# Patient Record
Sex: MALE | Race: WHITE
[De-identification: names, ages, dates, MRNs, and addresses within clinical notes are randomized per-mention and may not be internally consistent; named-entity substitution may affect disease eponyms.]

---

## 2018-10-29 ENCOUNTER — HOSPITAL ENCOUNTER (INPATIENT)
Dept: HOSPITAL 25 - ED | Age: 68
LOS: 4 days | Discharge: HOME HEALTH | DRG: 309 | End: 2018-11-02
Attending: INTERNAL MEDICINE | Admitting: HOSPITALIST
Payer: COMMERCIAL

## 2018-10-29 DIAGNOSIS — R00.1: ICD-10-CM
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
## 2018-10-29 DIAGNOSIS — Z86.73: ICD-10-CM

## 2018-10-29 DIAGNOSIS — J44.9: ICD-10-CM

## 2018-10-29 DIAGNOSIS — S82.452A: ICD-10-CM

## 2018-10-29 DIAGNOSIS — I45.89: ICD-10-CM

## 2018-10-29 DIAGNOSIS — F17.210: ICD-10-CM

## 2018-10-29 DIAGNOSIS — S82.142A: Primary | ICD-10-CM

## 2018-10-29 DIAGNOSIS — I44.7: ICD-10-CM

## 2018-10-29 DIAGNOSIS — Z23: ICD-10-CM
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
## 2018-10-29 DIAGNOSIS — Z72.89: ICD-10-CM

## 2018-10-29 DIAGNOSIS — Z96.651: ICD-10-CM

## 2018-10-29 LAB
BASOPHILS # BLD AUTO: 0 10^3/UL (ref 0–0.2)
EOSINOPHIL # BLD AUTO: 0.1 10^3/UL (ref 0–0.6)
HCT VFR BLD AUTO: 32 % (ref 42–52)
HGB BLD-MCNC: 11.6 G/DL (ref 14–18)
INR PPP/BLD: 1.08 (ref 0.77–1.02)
LYMPHOCYTES # BLD AUTO: 0.6 10^3/UL (ref 1–4.8)
MCH RBC QN AUTO: 36 PG (ref 27–31)
MCHC RBC AUTO-ENTMCNC: 36 G/DL (ref 31–36)
MCV RBC AUTO: 100 FL (ref 80–94)
MONOCYTES # BLD AUTO: 0.7 10^3/UL (ref 0–0.8)
NEUTROPHILS # BLD AUTO: 11.3 10^3/UL (ref 1.5–7.7)
NRBC # BLD AUTO: 0 10^3/UL
NRBC BLD QL AUTO: 0.1
PLATELET # BLD AUTO: 213 10^3/UL (ref 150–450)
RBC # BLD AUTO: 3.18 10^6/UL (ref 4–5.4)
WBC # BLD AUTO: 12.7 10^3/UL (ref 3.5–10.8)

## 2018-10-29 PROCEDURE — 85610 PROTHROMBIN TIME: CPT

## 2018-10-29 PROCEDURE — 80048 BASIC METABOLIC PNL TOTAL CA: CPT

## 2018-10-29 PROCEDURE — 36415 COLL VENOUS BLD VENIPUNCTURE: CPT

## 2018-10-29 PROCEDURE — 90686 IIV4 VACC NO PRSV 0.5 ML IM: CPT

## 2018-10-29 PROCEDURE — 72190 X-RAY EXAM OF PELVIS: CPT

## 2018-10-29 PROCEDURE — 86901 BLOOD TYPING SEROLOGIC RH(D): CPT

## 2018-10-29 PROCEDURE — 86850 RBC ANTIBODY SCREEN: CPT

## 2018-10-29 PROCEDURE — 85025 COMPLETE CBC W/AUTO DIFF WBC: CPT

## 2018-10-29 PROCEDURE — 76001: CPT

## 2018-10-29 PROCEDURE — C1776 JOINT DEVICE (IMPLANTABLE): HCPCS

## 2018-10-29 PROCEDURE — 81003 URINALYSIS AUTO W/O SCOPE: CPT

## 2018-10-29 PROCEDURE — 86900 BLOOD TYPING SEROLOGIC ABO: CPT

## 2018-10-29 PROCEDURE — 99284 EMERGENCY DEPT VISIT MOD MDM: CPT

## 2018-10-29 PROCEDURE — 93306 TTE W/DOPPLER COMPLETE: CPT

## 2018-10-29 PROCEDURE — 93005 ELECTROCARDIOGRAM TRACING: CPT

## 2018-10-29 PROCEDURE — 90732 PPSV23 VACC 2 YRS+ SUBQ/IM: CPT

## 2018-10-29 PROCEDURE — 71045 X-RAY EXAM CHEST 1 VIEW: CPT

## 2018-10-29 PROCEDURE — C8929 TTE W OR WO FOL WCON,DOPPLER: HCPCS

## 2018-10-29 PROCEDURE — 80053 COMPREHEN METABOLIC PANEL: CPT

## 2018-10-29 PROCEDURE — C1713 ANCHOR/SCREW BN/BN,TIS/BN: HCPCS

## 2018-10-29 PROCEDURE — 85730 THROMBOPLASTIN TIME PARTIAL: CPT

## 2018-10-29 RX ADMIN — MORPHINE SULFATE PRN MG: 4 INJECTION INTRAVENOUS at 15:55

## 2018-10-29 RX ADMIN — HEPARIN SODIUM SCH UNITS: 5000 INJECTION INTRAVENOUS; SUBCUTANEOUS at 18:28

## 2018-10-29 RX ADMIN — MORPHINE SULFATE PRN MG: 4 INJECTION INTRAVENOUS at 21:38

## 2018-10-29 RX ADMIN — DOCUSATE SODIUM SCH MG: 100 CAPSULE, LIQUID FILLED ORAL at 21:38

## 2018-10-29 RX ADMIN — HEPARIN SODIUM SCH: 5000 INJECTION INTRAVENOUS; SUBCUTANEOUS at 21:28

## 2018-10-29 NOTE — RAD
Indication: LEFT tibial plateau fracture. Fall from ladder.



Comparison: Radiographs of the same date.



Technique: Noncontrast CT LEFT knee. Multiplanar reformation.



Report: Large joint effusion with fat fluid level. Small volume of fluid at the

semimembranosus bursa.



Negative for dislocation.



Bone density appears decreased throughout. Comminuted impacted tibial plateau fracture

with dominant oblique axial, coronal, and lateral para midline sagittal fracture planes.

Up to 0.8 cm transverse articular surface discontinuity at the lateral joint compartment

posteriorly. Up to approximate 0.3 cm impaction of the lateral tibial plateau subchondral

bone.



Markedly comminuted fracture of the head and neck of the fibula with segmental impaction.

Extension of the fibula and tibial plateau fractures to the proximal tibiofibular

articulation.



Diffuse soft tissue edema most marked anteriorly.



Osteoarthritis most marked at the medial joint compartment with moderately severe joint

space narrowing, partial flattening of the articular surfaces, and associated subchondral

sclerosis and cystic change.



IMPRESSION: 

#. Comminuted impacted tibial plateau and proximal fibular fractures with associated

lipohemarthrosis as described. 

#. Predisposing decreased bone density. 

#.  Osteoarthritis.

## 2018-10-29 NOTE — RAD
INDICATION:  Left lower leg injury.



TECHNIQUE: 2 views of the left lower leg were obtained.



FINDINGS: There is diffuse soft tissue swelling around the knee extending into the calf.

Again note is made of a comminuted displaced intra-articular fracture of the proximal

tibia and an oblique comminuted slightly displaced fracture of the proximal fibula. No

additional fractures are seen.



IMPRESSION:  COMMINUTED DISPLACED INTRA-ARTICULAR FRACTURE OF THE PROXIMAL TIBIA AND

OBLIQUE COMMINUTED DISPLACED FRACTURE OF THE PROXIMAL FIBULA AS PREVIOUSLY DESCRIBED. NO

ADDITIONAL FRACTURES ARE SEEN.

## 2018-10-29 NOTE — RAD
Indication: Fall off ladder with lower extremity pain and deformity.



4 views of the pelvis including inlet and outlet views demonstrates sacroiliac joint to be

intact. No fracture of the pelvis is noted. Sacral foramina are patent.



IMPRESSION: No fracture of the pelvis is noted.

## 2018-10-29 NOTE — RAD
Indication: Fall, chest pain.



2 views of the chest including dual energy PA view demonstrates no mediastinal shift. Mild

cardiomegaly is noted. Lung fields are clear.



IMPRESSION: Cardiomegaly with no evidence of active cardiopulmonary disease.

## 2018-10-29 NOTE — ED
Progress





- Progress Note


Progress Note: 


posterior fiberglass splint placed on Lt LE - NV intact - pt tolerated well - 

assisted by MEGAN Machado





Re-Evaluation





- Re-Evaluation


  ** First Eval


Re-Evaluation Time: 12:52


Comment: Discussed results of x-rays with patient as well as need for surgery. 

Wife is now present in ED. Wife reports PMHx of COPD in patient. Patient and 

wife are from [place], they are agreeable with patient having surgery at [facility]. 

Wife reports collar bone fracture and skull fracture years ago.





Course/Dx





- Course


Course Of Treatment: Patient is a 69 y/o M w/ c/o left leg pain after falling 

from a six foot ladder. Per EMS, patient was 2 steps up from the ground. 

Patient reports that he was cutting some branches with a saw, tried to take a 

couple of steps up the ladder, leaned back too far and fell. EMS reports patient

's left leg was externally rotated 90 degrees upon arrival. Patient arrived 

with left leg splinted, no open fracture. Leg is noted to be bruised and 

swollen. On physical exam, patient is noted to have swelling at left lower leg, 

bruising to anterior shin, good pulses, good cap refill. inability to lift left 

leg, FROM at left ankle. Multiple x-rays were done, LLE X-RAY, PER RADIOLOGIST, 

SHOWED COMMINUTED DISPLACED INTRA-ARTICULAR FRACTURE OF THE PROXIMAL TIBIA AND.

  OBLIQUE COMMINUTED DISPLACED FRACTURE OF THE PROXIMAL FIBULA AS PREVIOUSLY 

DESCRIBED. NO.  ADDITIONAL FRACTURES ARE SEEN.  1252 - Discussed results of x-

rays with patient as well as need for surgery. Wife is now present in ED. Wife 

reports PMHx of COPD in patient. Patient and wife are from [place], they are 

agreeable with patient having surgery at [facility]. Wife reports collar bone fracture 

and skull fracture years ago.





- Provider Notifications


Time Discussed With Above Provider: 12:55


Instructed by Provider To: Other - 1255 - Dr. Matthew, on call ortho, was 

attempted to be contacted, he is in surgery, will call back.





Discharge





- Sign-Out/Discharge


Documenting (check all that apply): Patient Departure





- Discharge Plan


Condition: Stable


Disposition: ADMITTED TO [facility]


Referrals: 


No Primary Care Phys,NOPCP [Primary Care Provider] - 





- Billing Disposition and Condition


Condition: STABLE


Disposition: Admitted to [facility]

## 2018-10-29 NOTE — CONS
CONSULTATION REPORT:

 

DATE OF CONSULT:  10/29/18

 

ATTENDING ORTHOPEDIC PROVIDER:  Dr. Irving Duran.

 

CHIEF COMPLAINT:  Left leg pain.

 

HISTORY OF PRESENT ILLNESS:  Ramone is a 68-year-old male, who presented to 
[facility] today after sustaining a fall while at work
, off the second rung on the ladder landing on his left lower extremity.  The 
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  He was working at [employer] on this 
occasion.  He states that he was using a [employer], which jerked and he stepped 
back thinking the floor was much closer than it was, then he fell to the 
ground. Immediately, he had pain of the left lower extremity and was unable to 
ambulate.  The patient states that he currently does not have any pain of the 
left lower extremity while he is lying, resting in bed.  He states that he has 
no other injury from this fall. Per ER report there was no open fracture and a 
long leg posterior splint was placed. He has had falls in the past while at 
work under similar circumstance on 2 other occasions falling off a scaffolding, 
resulting in fractures of his left clavicle and skull fracture which required 
placement of a plate.  The patient is unsure if he had a brain bleed or 
traumatic brain injury at this time, but he has no residual effects.  The 
patient denies any feeling of chest pain, shortness of breath, dizziness prior 
to fall.He has no past medical history of stroke, heart attack, blood clot, 
blood transfusion, HIV, or hepatitis.  

 

PAST MEDICAL HISTORY: COPD for which he does not use any medication for 
treatment and is asymptomatic.

 

MEDICATIONS:  Does not take any medication at home.

 

ALLERGIES:  No known drug allergies.

 

FAMILY HISTORY:  Adopted.



Surgical history: right total knee replacement, hernia repair, placement of 
plate s/p skull fracture

 

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  His 
current job is at [employer].  The patient drinks alcohol daily for a total 
of 48 to 72 ounces of beer per day.  He states that he has stopped drinking in 
the past without having any withdrawal symptoms.  He does smoke every day 6 to 
8 cigarillos, though none in the past week.  He does not use any illicit drugs.
  He lives at home with his wife, Jessika, without any assistance at home.  He 
does not use any assistive device to walk.

 

REVIEW OF SYSTEMS:  General:  Negative for fever, chills, recent illness.  HEENT
: No changes in vision, headache, or head trauma on this occasion.  He does 
have a history of head trauma after falling off a scaffolding with no residual 
effects. Cardiac:  No chest pain.  No irregular beats.  No history of heart 
attack. Respiratory:  No shortness of breath.  No cough.  Does have a history 
of COPD. Abdomen:  No abdominal pain.  No vomiting, diarrhea, nausea, or 
constipation.  : No dysuria.  No difficulty with urinary.  Musculoskeletal:  
Positive for left lower extremity pain and known tibial plateau and fibular 
fracture.  Neuro:  Sensation intact throughout all extremities without numbness 
or paraesthesias.  Heme:  No history of blood clot, easy bleeding, or easy 
brushing.  Skin:  No rash or lesions.

 

PHYSICAL EXAM:  Vital Signs:  Temperature 98.3, pulse rate 57, respiratory rate 
16, oxygen saturation 100, blood pressure 102/61.  The patient is well appearing
, in no acute distress.  Alert and oriented to person, place, and time, carries 
on an appropriate conversation.  HEENT:  Head is normocephalic,   he does have 
a surgical scar just anterior to his ear over his right temporal lobe.  
Extraocular movements intact.  Slight yellow discoloration of eyes. Moist 
mucous membranes.  Poor dentition with many absent teeth.  Cardiac:  S1, S2.  
Regular rate and rhythm. Respiratory:  Clear to auscultation bilaterally.  No 
wheezes, rales, or rhonchi. Abdomen:  Bowel sounds normoactive in all 4 
quadrants.  Nontender to palpation.  No guarding.  No rigidity.  Upper 
Extremities:  With skin envelope intact.  No obvious deformity.  Nontender to 
palpation.  Active flexion and extension of the digits, wrists, and elbows 
without any associated pain.  Shoulders with nonpainful active forward flexion 
and abduction.  Left Lower Extremity:  He has a long leg posterior splint in 
place.  There is no obvious rotation about the extremity. His thigh and calf 
remain easily compressible without tenderness to palpation.  His toes are 
exposed and able flex and extend MTPs. DP2+  Capillary refill is less than 2 
seconds distally.  His exposed thigh is soft, nontender.  Passive flexion and 
extension at the hip without any pain.  Logroll of the hip is nonpainful.  
Right lower extremity:  Skin envelope is intact.  No obvious deformity.  
Nontender to palpation.  Active flexion and extension of digits, ankle, knee, 
and hip without any pain.  Negative logroll.  Neuro:  Sensation is intact to 
light touch distally in left lower extremity including plantar,dorsal, medial, 
lateral and 1st webspace of foot.  Sensation intact to light touch throughout 
right lower extremity and bilateral upper extremities.  The patient is alert 
and oriented to conversation.  Skin:  There is no obvious skin breakdown. There 
are no lacerations or lesions noted.

 

DIAGNOSTIC STUDIES/LAB DATA:  CT of the left lower extremity.  Impression per 
Radiology:  Comminuted-impacted tibial plateau and proximal fibular fractures 
with associated lipohemarthrosis present, predisposing decreased bone density, 
osteoarthritis.



LLE xray: IMPRESSION:  COMMINUTED DISPLACED INTRA-ARTICULAR FRACTURE OF THE 
PROXIMAL TIBIA AND

OBLIQUE COMMINUTED DISPLACED FRACTURE OF THE PROXIMAL FIBULA AS PREVIOUSLY 
DESCRIBED. NO

ADDITIONAL FRACTURES.



HIP LEFT 2 VIEWS AND PELVIS: IMPRESSION: No fracture of the left hip or pelvis 
is noted.



ANKLE LEFT 3+VWS IMPRESSION:  NO EVIDENCE FOR FRACTURE.



White blood cell count 12.7, hemoglobin 11.6, hematocrit 32.  INR 1.08.  Sodium 
137, potassium 3.6.

 

ASSESSMENT:  Left tibial plateau and proximal fibula fracture.

 

PLAN/RECOMMENDATIONS:  The patient will be nonweightbearing on his left lower 
extremity.  Keep long leg posterior splint in place.  Ensure edges of splint 
are all well padded to avoid any skin breakdown, padding was reinforced today.  
The patient will be brought to the OR for Ex-Fix vs ORIF with Dr. Duran; we 
anticipate either 10/31/18.  He will be on chemical and mechanical DVT 
prophylaxis until midnight before his surgery.  At midnight preceding  his 
surgery, he will also need to be n.p.o. Elevate and ice the LLE.

 

____________________________________ EMMY CROCKER

 

[id number]/CPS #: [id number]

[facility]ar[facility] HospitalJOSE LUIS

## 2018-10-29 NOTE — RAD
Indication: Left leg pain and hip pain.



2 views of left hip, and 2 views of the left femur are reviewed.



The left hip demonstrates no fracture. No other bone or joint abnormality is identified.

The left femur demonstrates no fracture of the femur. The visualized pelvis is

unremarkable.



IMPRESSION: No fracture of the left hip or pelvis is noted.

## 2018-10-29 NOTE — RAD
INDICATION: Left knee injury.



TECHNIQUE: 2 views of the left knee were obtained.

  

FINDINGS:  There is diffuse soft tissue swelling. There is a moderate joint effusion

present.  There is a transverse comminuted intra-articular fracture of the tibial

metaphysis with extension to the lateral articular surface. The fracture fragments are

impacted and slightly distracted. There is also an oblique slightly displaced comminuted

fracture of the proximal fibular metaphysis.



IMPRESSION:  

1. COMMINUTED DISPLACED INTRA-ARTICULAR FRACTURE OF THE PROXIMAL TIBIA.

2. OBLIQUE COMMINUTED SLIGHTLY DISPLACED FRACTURE OF THE PROXIMAL FIBULA.

## 2018-10-29 NOTE — ED
Lower Extremity





- HPI Summary


HPI Summary: 


Patient is a 69 y/o M w/ c/o left leg pain after falling from a six foot 

ladder. Per EMS, patient was 2 steps up from the ground. Patient reports that 

he was cutting some branches with a saw, tried to take a couple of steps up the 

ladder, leaned back too far and fell. EMS reports patient's left leg was 

externally rotated 90 degrees upon arrival. Patient arrived with left leg 

splinted, no open fracture. Leg is noted to be bruised and swollen. PMHx of 

stroke, MI is denied, patient is adopted. He denies the need for pain 

medication. Patient denies fever, chills, HA, ear pain, sore throat, blurred 

vision, double vision, neck pain, CP, SOB, ABD pain, back pain, dysuria, 

hematuria, blood in the stool, diarrhea, vomiting, anxiety and depression. On 

triage, pain is rated 7/10, nothing is noted to aggravate/alleviate Sx. Home 

medications and allergies are reviewed. 








- History of Current Complaint


Chief Complaint: EDExtremityLower


Stated Complaint: LT LEG INJURY


Hx Obtained From: Patient


Mechanism Of Injury: Fall From Height Of: - two ladder rungs off the ground


Onset of Pain: Prior to Arrival


Onset/Duration: Still Present


Severity Currently: Severe - 7/10


Pain Intensity: 7


Pain Scale Used: 0-10 Numeric - 7/10


Timing: Constant


Location: Is Discrete @ - left leg


Associated Signs And Symptoms: Positive: Swelling, Bruising


Aggravating Factor(s): Nothing


Alleviating Factor(s): Nothing





- Allergies/Home Medications


Allergies/Adverse Reactions: 


 Allergies











Allergy/AdvReac Type Severity Reaction Status Date / Time


 


No Known Allergies Allergy   Verified 10/29/18 11:01











Home Medications: 


 Home Medications





NK [No Home Medications Reported]  10/29/18 [History Confirmed 10/29/18]











PMH/Surg Hx/FS Hx/Imm Hx


Cardiovascular History: 


   Denies: Hx Myocardial Infarction


Neurological History: 


   Denies: Hx CVA


Infectious Disease History: No


Infectious Disease History: 


   Denies: Traveled Outside the [place] in Last 30 Days





- Family History


Known Family History: Positive: Unknown - adopted 





- Social History


Alcohol Use: Daily


Substance Use Type: Reports: None


Smoking Status (MU): Current Every Day Smoker





Review of Systems


Negative: Fever, Chills


Positive: Other - NEGATIVE: double vision.  Negative: Blurred Vision


Negative: Sore Throat, Ear Ache


Negative: Chest Pain


Negative: Cough


Negative: Abdominal Pain, Vomiting, Diarrhea


Positive: other - NEGATIVE: blood in stool .  Negative: dysuria, hematuria


Positive: Edema - left leg , Other - left leg pain 


Positive: Bruising - left leg 


Negative: Headache


Negative: Anxious, Depressed


All Other Systems Reviewed And Are Negative: No





Physical Exam





- Summary


Physical Exam Summary: 


Appearance: Alert, conversive, nontoxic appearing


Skin: Warm, dry, no mottling, no rashes, no contusions


HEENT: EOMI, PERRL, moist mucous membranes


Neck: No masses on the neck, supple


Respiratory: Clear to auscultation, breath sounds present, no rales, no rhonchi

, no wheezes


Cardiovascular: RRR, pulses are symmetrical in both lower and upper extremities


Abdomen: Soft, non-tender


Bowel Sounds: Present


Musculoskeletal: No CVA tenderness, swelling at left lower leg, bruising to 

anterior shin, good pulses, good cap refill. inability to lift left leg, FROM 

at left ankle


Neurological: A&Ox3, CN II-XII Intact, moving all extremities symmetrically


Psychiatric: Normal affect and mood








Triage Information Reviewed: Yes


Vital Signs On Initial Exam: 


 Initial Vitals











Temp Pulse Resp BP Pulse Ox


 


 97.5 F   59   20   144/82   98 


 


 10/29/18 10:56  10/29/18 10:56  10/29/18 10:56  10/29/18 10:56  10/29/18 10:56











Vital Signs Reviewed: Yes





Diagnostics





- Vital Signs


 Vital Signs











  Temp Pulse Resp BP Pulse Ox


 


 10/29/18 10:56  97.5 F  59  20  144/82  98














- Laboratory


Result Diagrams: 


 10/29/18 13:44





 10/29/18 13:44


Lab Statement: Any lab studies that have been ordered have been reviewed, and 

results considered in the medical decision making process.





- Radiology


  ** left ankle x-ray


Radiology Interpretation Completed By: Radiologist


Summary of Radiographic Findings: IMPRESSION:  NO EVIDENCE FOR FRACTURE. THIS 

REPORT WAS REVIEWED BY ED PHYSICIAN.





  ** LEFT KNEE X-RAY


Radiology Interpretation Completed By: Radiologist


Summary of Radiographic Findings: IMPRESSION:  1. COMMINUTED DISPLACED INTRA-

ARTICULAR FRACTURE OF THE PROXIMAL TIBIA.  2. OBLIQUE COMMINUTED SLIGHTLY 

DISPLACED FRACTURE OF THE PROXIMAL FIBULA.  THIS REPORT WAS REVIEWED BY ED 

PHYSICIAN.





  ** LEFT FEMUR X-RAY


Radiology Interpretation Completed By: Radiologist


Summary of Radiographic Findings: IMPRESSION: No fracture of the left hip or 

pelvis is noted. THIS REPORT WAS REVIEWED BY ED PHYSICIAN.





  ** HIP/PELVIS X-RAY


Radiology Interpretation Completed By: Radiologist


Summary of Radiographic Findings: IMPRESSION: No fracture of the left hip or 

pelvis is noted. THIS REPORT WAS REVIEWED BY ED PHYSICIAN.





  ** PELVIS X-RAY


Radiology Interpretation Completed By: Radiologist


Summary of Radiographic Findings: IMPRESSION: No fracture of the pelvis is 

noted. THIS REPORT WAS REVIEWED BY ED PHYSICIAN.





  ** LLE x-ray


Radiology Interpretation Completed By: Radiologist


Summary of Radiographic Findings: IMPRESSION:  COMMINUTED DISPLACED INTRA-

ARTICULAR FRACTURE OF THE PROXIMAL TIBIA AND.  OBLIQUE COMMINUTED DISPLACED 

FRACTURE OF THE PROXIMAL FIBULA AS PREVIOUSLY DESCRIBED. NO.  ADDITIONAL 

FRACTURES ARE SEEN.  THIS REPORT WAS REVIEWED BY ED PHYSICIAN.





  ** CXR


Radiology Interpretation Completed By: Radiologist


Summary of Radiographic Findings: cardiomegaly with no evidence of active 

cardiopulmonary disease, this report was reviewed by ED physician.





- EKG


  ** 1310


Cardiac Rate: Bradycardia - rate of 53 BPM


EKG Rhythm: Sinus Bradycardia


Summary of EKG Findings: prolonged ID, prolonged QRS, normal QTC, nonspecific ST

-T waves changes noted, depression in v5, v6 and lead 1 and avf.





Re-Evaluation





- Re-Evaluation


  ** First Eval


Re-Evaluation Time: 12:52


Comment: Discussed results of x-rays with patient as well as need for surgery. 

Wife is now present in ED. Wife reports PMHx of COPD in patient. Patient and 

wife are from [place], they are agreeable with patient having surgery at [facility]. 

Wife reports collar bone fracture and skull fracture years ago.





Lower Extremity Course/Dx





- Course


Course Of Treatment: Patient is a 69 y/o M w/ c/o left leg pain after falling 

from a six foot ladder. Per EMS, patient was 2 steps up from the ground. 

Patient reports that he was cutting some branches with a saw, tried to take a 

couple of steps up the ladder, leaned back too far and fell. EMS reports patient

's left leg was externally rotated 90 degrees upon arrival. Patient arrived 

with left leg splinted, no open fracture. Leg is noted to be bruised and 

swollen. On physical exam, patient is noted to have swelling at left lower leg, 

bruising to anterior shin, good pulses, good cap refill. inability to lift left 

leg, FROM at left ankle. During ED course, patient received fluids and dilaudid 

1 mg. UA negative. Labs showed glucose 120, calcium 8.5, INR 1.08, WBC 12.7, 

RBC 3.18.  Multiple x-rays were done, LLE X-RAY, PER RADIOLOGIST, SHOWED 

COMMINUTED DISPLACED INTRA-ARTICULAR FRACTURE OF THE PROXIMAL TIBIA AND.  

OBLIQUE COMMINUTED DISPLACED FRACTURE OF THE PROXIMAL FIBULA AS PREVIOUSLY 

DESCRIBED. NO.  ADDITIONAL FRACTURES ARE SEEN.  No ankle, hip, pelvis fracture 

is noted on x-rays.  CXR showed cardiomegaly with no evidence of active 

cardiopulmonary disease.  EKG showed sinus bradycardia with rate of 53 BPM, 

prolonged ID, prolonged QRS, normal QTC, nonspecific ST-T waves changes noted, 

depression in v5, v6 and lead 1 and avf.  1252 - Discussed results of x-rays 

with patient as well as need for surgery. Wife is now present in ED. Wife 

reports PMHx of COPD in patient. Patient and wife are from [place], they are 

agreeable with patient having surgery at [facility]. Wife reports collar bone fracture 

and skull fracture years ago.  1255 - Dr. Matthew, on call ortho, was attempted 

to be contacted, he is in surgery, will call back. 1338 - Discussed patient's 

case with Dr. Drake, Dr. Drake agrees to accept patient for admission. 1401 - 

Dr. Matthew called back. He notes that patient has a complicated fracture, he 

will see if any of his partners will take patient's case. 1407 - Dr. Matthew 

states that Dr. Francois will takes the patient's case and that patient should 

be placed on NPO after midnight. Posterior fiberglass splint placed on Lt LE by 

EMMY Novoa. Dx of closed fracture of proximal end of tibia and fibia.





- Diagnoses


Provider Diagnoses: 


 Closed fracture of proximal end of tibia and fibula








- Physician Notifications


Discussed Care Of Patient With: Ben Matthew


Time Discussed With Above Provider: 12:55


Instructed by Provider To: Other - 1255 - Dr. Matthew, on call ortho, was 

attempted to be contacted, he is in surgery, will call back. 1338 - Discussed 

patient's case with Dr. Drake, Dr. Drake agrees to accept patient for 

admission. 1401 - Dr. Matthew called back. He notes that patient has a 

complicated fracture, he will see if any of his partners will take patient's 

case. 1407 - Dr. Matthew states that Dr. Francois will takes the patient's case 

and that patient should be placed on NPO after midnight.





Discharge





- Sign-Out/Discharge


Documenting (check all that apply): Patient Departure - admit 


All imaging exams completed and their final reports reviewed: Yes





- Discharge Plan


Condition: Stable


Disposition: ADMITTED TO [facility]





- Billing Disposition and Condition


Condition: STABLE


Disposition: Admitted to [facility]





- Attestation Statements


Document Initiated by Scribe: Yes


Documenting Scribe: Elvis Vasquez 


Provider For Whom Patricio is Documenting (Include Credential): Yahaira Cummings MD


Scribe Attestation: 


Elvis GOODEN , scribed for Yahaira Cummings MD on 10/29/18 at 2032. 


Scribe Documentation Reviewed: Yes


Provider Attestation: 


The documentation as recorded by the [email or web address] cortes  accurately reflects 

the service I personally performed and the decisions made by me, Yahaira Cummings MD

## 2018-10-29 NOTE — RAD
INDICATION:  Left ankle injury.



TECHNIQUE: 3 views of the left ankle were obtained.



FINDINGS:  The bones are in normal alignment. No fracture is seen. Joint spaces appear

maintained.



IMPRESSION:  NO EVIDENCE FOR FRACTURE.

## 2018-10-29 NOTE — HP
CC:  Dr. Pb De Santiago, phone # [phone]; Dr. Matthew *

 

HISTORY AND PHYSICAL:

 

DATE OF ADMISSION:  10/29/18

 

TIME OF EVALUATION:  1:45 p.m.

 

PRIMARY CARE PROVIDER:  Dr. Pb De Santiago, [place], [place].  Phone number is 
[phone].

 

CONSULTING ORTHOPEDIST:  Dr. Matthew.

 

CHIEF COMPLAINT:  "I fell."

 

HISTORY OF PRESENT ILLNESS:  Mr. Collier is a 68-year-old male with a past 
medical history of COPD who presented to the emergency room, brought in by EMS 
after sustaining a fall.  The patient is a , states that he 
was on the last 2 rungs from a ladder and he states that he was working with a 
[employer] when he bumped into a stud and it pushed him backwards.  He thought 
that he was at a low enough distance that he could just land on his feet, but 
he states that immediately after landing, he developed severe left lower 
extremity pain.  As per EMS, the patient's left leg was externally rotated at 
90 degrees upon arrival.

 

The patient denies chest pain, palpitations, shortness of breath, or any other 
symptoms preceding his fall.  He states that the only issue was that he lost 
his balance when the [employer] kicked back.

 

The patient was a smoker until last week, but he states that he has quit.  As 
described, he denies any chest pain.  He is physically active doing his work 
with no complaints.

 

PAST MEDICAL HISTORY:  COPD.

 

MEDICATIONS:  None.

 

ALLERGIES:  No known drug allergies.

 

FAMILY HISTORY:  Unknown as the patient is adopted.

 

SOCIAL HISTORY:  He is a .  He used to smoke 10 small cigars 
a day since he was 26.  He states that he drinks two 24-ounce beers every night
, but denies ever having issues with withdrawal on the days that he did not 
drink.  There is no drug use.  Surrogate decision maker is his wife, Jessika Collier, phone number [phone].

 

REVIEW OF SYSTEMS:  A 14-point review of systems was performed and all the 
pertinent negative and positive findings are in the HPI.

 

                               PHYSICAL EXAMINATION

 

GENERAL:  The patient is a pleasant gentleman lying in the ED stretcher, not in 
acute distress.

 

VITAL SIGNS:  Temperature 97.5, heart rate 59, respiratory rate is 16, oxygen 
saturation 99% on room air, blood pressure is 112/65.

 

HEENT:  Pupils are equal.  Moist mucous membranes.

 

CHEST:  Breath sounds bilaterally with no added sounds.

 

CVS:  Normal S1 and S2.  Regular rate and rhythm.

 

ABDOMEN:  Soft.  Bowel sounds are present.

 

EXTREMITIES:  There is deformity and edema to the left lower extremity.  
Sensation is intact.  Good pulses and good capillary refill bilaterally.

 

NEURO:  He is alert and oriented x3.  Able to move all 4 extremities.

 

 LABORATORY AND IMAGING DATA:  The patient's urinalysis was negative.  Other 
laboratory tests are pending at the time of this dictation.

 

An EKG done on 10/29/18 at 1:09 p.m. showed sinus bradycardia at 53 beats per 
minute with a left bundle-branch block.  There is no prior EKG to compare.

 

Pelvis x-ray showed no fracture of the pelvis.  There is no fracture of the 
left hip.  Femur x-ray, no fracture of the left femur.  Knee x-ray showed the 
comminuted displaced intraarticular fracture of the proximal left tibia with an 
oblique comminuted slightly displaced fracture of the proximal fibula.  Ankle x-
ray showed no evidence for fracture.  Chest x-ray showed cardiomegaly with no 
evidence of active cardiopulmonary disease.

 

ASSESSMENT AND PLAN:  Mr. Collier is a 68-year-old male with a past medical 
history of chronic obstructive pulmonary disease, tobacco abuse who presented 
to the emergency room, brought in by EMS after a mechanical fall at work, found 
to have left tib-fib fracture.

 

1.  Left tib-fib fracture.

 

Orthopedics consultation was requested with Dr. Matthew who is in the OR at this 
moment, so we will wait for his recommendations.

 

If the patient does need surgical treatment, we will have to obtain records 
from his primary care provider, especially a prior EKG.  The patient does not 
have complaints of chest pain, palpitations, or shortness of breath, but his 
EKG does show a left bundle-branch block of unknown age.  His chest x-ray also 
shows cardiomegaly.

 

At the time of the dictation, his laboratory tests are pending. We will also 
obtain records from his Primary care provider, so that information will need to 
be analyzed before we can say the patient is optimized for surgery.

2.  Chronic obstructive pulmonary disease appears to be stable at this time.

3.  DVT prophylaxis.  The patient has a score of 3 on the DVT Prophylaxis Risk 
Assessment Guide and I am not going to give him any subcutaneous heparin for 
now until Orthopedics sees him, as he may need surgical repair but he is going 
to have SCDs on the right leg.

4.  Code status is full.

 

TIME SPENT:  Approximately 45 minutes were spent with patient interview, 
medical records review, physical examination to complete the admission; more 
than half of this time was spent face-to-face with the patient and coordination 
of care.

 

[id number]/CPS #: [id number]

AGUSTIN

## 2018-10-30 LAB
BASOPHILS # BLD AUTO: 0 10^3/UL (ref 0–0.2)
EOSINOPHIL # BLD AUTO: 0.2 10^3/UL (ref 0–0.6)
HCT VFR BLD AUTO: 27 % (ref 42–52)
HGB BLD-MCNC: 9.9 G/DL (ref 14–18)
LYMPHOCYTES # BLD AUTO: 0.7 10^3/UL (ref 1–4.8)
MCH RBC QN AUTO: 37 PG (ref 27–31)
MCHC RBC AUTO-ENTMCNC: 37 G/DL (ref 31–36)
MCV RBC AUTO: 99 FL (ref 80–94)
MONOCYTES # BLD AUTO: 0.7 10^3/UL (ref 0–0.8)
NEUTROPHILS # BLD AUTO: 3.4 10^3/UL (ref 1.5–7.7)
NRBC # BLD AUTO: 0 10^3/UL
NRBC BLD QL AUTO: 0
PLATELET # BLD AUTO: 160 10^3/UL (ref 150–450)
RBC # BLD AUTO: 2.7 10^6/UL (ref 4–5.4)
WBC # BLD AUTO: 5 10^3/UL (ref 3.5–10.8)

## 2018-10-30 RX ADMIN — DOCUSATE SODIUM SCH MG: 100 CAPSULE, LIQUID FILLED ORAL at 08:30

## 2018-10-30 RX ADMIN — HEPARIN SODIUM SCH UNITS: 5000 INJECTION INTRAVENOUS; SUBCUTANEOUS at 15:49

## 2018-10-30 RX ADMIN — DOCUSATE SODIUM SCH MG: 100 CAPSULE, LIQUID FILLED ORAL at 21:16

## 2018-10-30 RX ADMIN — MORPHINE SULFATE PRN MG: 4 INJECTION INTRAVENOUS at 13:53

## 2018-10-30 RX ADMIN — MORPHINE SULFATE PRN MG: 4 INJECTION INTRAVENOUS at 08:29

## 2018-10-30 RX ADMIN — MORPHINE SULFATE PRN MG: 4 INJECTION INTRAVENOUS at 02:02

## 2018-10-30 RX ADMIN — HEPARIN SODIUM SCH UNITS: 5000 INJECTION INTRAVENOUS; SUBCUTANEOUS at 21:15

## 2018-10-30 RX ADMIN — MORPHINE SULFATE PRN MG: 4 INJECTION INTRAVENOUS at 21:12

## 2018-10-30 RX ADMIN — HEPARIN SODIUM SCH UNITS: 5000 INJECTION INTRAVENOUS; SUBCUTANEOUS at 06:09

## 2018-10-30 NOTE — CONSULT
Consult


Consult: 


Please see Candace Rocha's note for full H&P details. I saw Ramone this 

morning.  He sustained a fall off of a ladder, about 6 feet, yesterday at work.

  He sustained an injury to his left knee.  He denies pain or injury anywhere 

else.  He reports that he had some mild pain in his knee from arthritis at 

baseline, but did not need any assistive devices for ambulation. he does have a 

history of a right total knee replacement.  He was admitted to the hospitalist 

service yesterday and placed into a long-leg splint.  He reports the pain is at 

the left knee and is daily, mild to moderate, sharp.  He denies any numbness or 

tingling.  He denies any back pain.





  He does smoke cigarettes.  He is not diabetic.  No history of VTE.





  On exam, he is in a long-leg splint.  I removed the overwrap around the knee.

  The skin is intact.  There is a moderate amount of swelling at the proximal 

tibia and knee.  He does have some tenderness about the proximal tibia.  He is 

able to flex and extend his ankle and toes without any pain.  Sensation is 

intact to light touch throughout the foot.  He has strong palpable DP pulses 

and foot is warm and well perfused with good capillary refill.  He is moving 

his other 3 extremities normally without any pain.  No obvious deformity in 

those extremities.





  His imaging shows a displaced, comminuted, bicondylar tibial plateau fracture.





WBC 5


HCT 27


Plt 160


INR 1.08


Cr 0.83





  I discussed the diagnosis and prognosis with Ramone at length this morning.  

I explained that these are very difficult injuries.  I didn't recommend 

surgical treatment to reduce the fractures and internally fixate them.  We 

discussed the risks/benefits and pros/cons of both nonoperative and operative 

treatment options at length.  He would like to move forward with surgery.  I 

did explain that he is quite swollen, so we may have to externally fixate him 

and perform the ORIF in a delayed fashion.  We will plan on surgery tomorrow 

morning for either an ORIF versus ex-fix.





  For now, he should remain nonweightbearing in the left lower extremity.  

Strict elevation of the left lower extremity to help improve the swelling.  I 

do recommend icing of the knee to also help with swelling.  He should be n.p.o. 

at midnight tonight for the OR tomorrow. I recommend a type and crossmatch for 

2 units of blood.





  Irving Duran MD

## 2018-10-30 NOTE — PN
Subjective


Date of Service: 10/30/18


Interval History: 


Patient seen and examined. States pain in LLE when he tenses his muscles or 

uses the trapeze to pull himself up in bed. Denies fever, headache or fatigue. 

No chest pain, no SOB, no further complaints. Discussed medical optimization 

for surgery extensively. Pending records from [facility]





Objective


Active Medications: 








Acetaminophen (Tylenol Tab*)  650 mg PO Q6H PRN


   PRN Reason: pain/fever


   Last Admin: 10/29/18 21:38 Dose:  650 mg


Docusate Sodium (Colace Cap*)  100 mg PO BID [facility]


   Last Admin: 10/30/18 08:30 Dose:  100 mg


Folic Acid (Folvite Tab*)  1 mg PO DAILY [facility]


Heparin Sodium (Porcine) (Heparin Vial(*))  5,000 units SUBCUT Q8HR [facility]


   Stop: 10/30/18 23:59


   Last Admin: 10/30/18 06:09 Dose:  5,000 units


Lorazepam (Ativan Tab(*))  0 - 6 mg PO .PER [facility] PROTOCOL [facility]; Protocol


Morphine Sulfate (Morphine Vial*)  4 mg IV Q4H PRN


   PRN Reason: PAIN


   Last Admin: 10/30/18 13:53 Dose:  4 mg


Multivitamins/Minerals (Theragran/Minerals Tab*)  1 tab PO DAILY [facility]


Prochlorperazine Edisylate (Compazine Inj*)  5 mg IV Q6H PRN


   PRN Reason: NAUSEA/VOMITING


Thiamine HCl (Vitamin B-1 Tab*)  100 mg PO DAILY [facility]








 Vital Signs - 8 hr











  10/30/18 10/30/18 10/30/18





  07:17 08:00 08:29


 


Temperature 99.1 F  


 


Pulse Rate 77  


 


Respiratory 16 18 18





Rate   


 


Blood Pressure 113/65  





(mmHg)   


 


O2 Sat by Pulse 99  





Oximetry   














  10/30/18





  13:53


 


Temperature 


 


Pulse Rate 


 


Respiratory 18





Rate 


 


Blood Pressure 





(mmHg) 


 


O2 Sat by Pulse 





Oximetry 











Oxygen Devices in Use Now: None


Appearance: Alert, NAD


Eyes: No Scleral Icterus, PERRLA


Ears/Nose/Mouth/Throat: Mucous Membranes Moist, - - poor dentition


Neck: NL Appearance and Movements; NL JVP, Trachea Midline


Respiratory: Symmetrical Chest Expansion and Respiratory Effort, Clear to 

Auscultation


Cardiovascular: NL Sounds; No Murmurs; No JVD, RRR, No Edema


Abdominal: NL Sounds; No Tenderness; No Distention


Extremities: No Clubbing, Cyanosis, - - edema diffuse LLE, distal pulses intact


Neurological: Alert and Oriented x 3


Nutrition: Taking PO's


Result Diagrams: 


 10/30/18 06:43





 10/30/18 06:43


Diagnostic Imaging: 





Patient Name:                    DAMASO TANG                              

                                                                            

Medical Record#: [id number]


Ordering Physician: Yahaira Cummings MD                                            

                                                                            

Acct.#: [id number]


:         1950                    Age: 68   Sex: M                    

                                                                            

Location: EMERGENCY DEPARTMENT


Exam Date: 10/29/18 1140                                                       

                                                                            ADM 

Status: REG ER


Order Information:                                               KNEE LEFT 1-2 

VWS


Accession Number:                                                [id number]


CPT:                                                             [id number]


INDICATION: Left knee injury.





TECHNIQUE: 2 views of the left knee were obtained.


  


FINDINGS:  There is diffuse soft tissue swelling. There is a moderate joint 

effusion


present.  There is a transverse comminuted intra-articular fracture of the 

tibial


metaphysis with extension to the lateral articular surface. The fracture 

fragments are


impacted and slightly distracted. There is also an oblique slightly displaced 

comminuted


fracture of the proximal fibular metaphysis.





IMPRESSION:  


1. COMMINUTED DISPLACED INTRA-ARTICULAR FRACTURE OF THE PROXIMAL TIBIA.


2. OBLIQUE COMMINUTED SLIGHTLY DISPLACED FRACTURE OF THE PROXIMAL FIBULA.





____________________________________________________________


<Electronically signed by Kenny Goncalves MD in OV>  10/29/18 1239


Dictated By: Kenny Goncalves MD


Dictated Date/Time: 10/29/18 1239


Transcribed Date/Time: 10/29/18 1237


Copy to:














Assess/Plan/Problems-Billing


Assessment: 





This is a 68 year old male with no significant reported medical history that 

presented to the ED after falling off a ladder at work and sustaining a left 

comminuted tib-fib fracture.





- Patient Problems


(1) Tibia/fibula fracture


Code(s): S82.209A - UNSP FRACTURE OF SHAFT OF UNSP TIBIA, INIT FOR CLOS FX; 

S82.409A - UNSP FRACTURE OF SHAFT OF UNSP FIBULA, INIT FOR CLOS FX   SNOMED Code

(s): 030226872


   Comment: 


 - Orthopedics following


 - Plan for OR tomorrow if optimized today


 - Pain control, NWB, elevate extremity   





(2) EKG abnormality


Code(s): R94.31 - ABNORMAL ELECTROCARDIOGRAM [ECG] [EKG]   SNOMED Code(s): 

483112255


   Comment: 


 - Pending records from cardiac workup in August prior to his recent RTKA


 - Per patient, he was told he had "slow heart rate" but nothing abnormal


 - Current EKG with potential ischemic changes in the lateral leads, but 

patient reports no history of chest pain or dyspnea


 - Discussed with Dr. Randolph who with see the patient and evaluate, recommends 

echo today and will evaluate records from previous facility


 - Barring any new findings from a cardiac perspective, patient should still be 

able to undergo his procedure tomorrow. He has no history of infarction, no Q 

wave and no current or recent chest pain, which would place his RCRI score at 

0.4% for risk of major cardiac event. Also he recently had knee replacement 

with no post-op complications. However, we will defer to cardiology's 

recommendations after evaluating ECHO, EKGs and previous records.


   





(3) Alcohol use


Code(s): Z78.9 - OTHER SPECIFIED HEALTH STATUS   SNOMED Code(s): 098939779


   Comment: 


 - Per patient, he drinks 2, 24 ounce beers daily, per wife this may be 

minimized


 - Will place on WA protocol and monitor for detox   





(4) DVT prophylaxis


Code(s): FTI0031 -    SNOMED Code(s): 052000766


   Comment: 


 - HSQ   





(5) Full code status


Code(s): Z78.9 - OTHER SPECIFIED HEALTH STATUS   SNOMED Code(s): 469868330


   


Status and Disposition: 





Inpatient. Coordinated with Dr. Randolph and EMMY Fournier.

## 2018-10-30 NOTE — ECHO
Patient:      DAMASO GIRALDO  

[facility] Rec#:     [id number]            :          1950          

Date:         10/30/2018            Age:          68y                 

Account#:     [id number]          Height:       178 cm / 70.1 in

Accession#:   [id number]           Weight:       78.5 kg / 173.0 lbs

Sex:          M                     BSA:          2

Room#:        339                   

Admit Date#:  10/29/2018          

Type:         Inpatient

 

Referring:    Chary Juarez

Reading:      Nando Randolph MD

Sonographer:  Loretta Beckwith RN RDCS

______________________________________________________________________

 

Transthoracic Echocardiogram

 

Indication:

Abnormal EKG

BP:           113/65

HR:           68

Rhythm:       NSR

 

Findings     

History:

COPD, cigar smoker 

 

Technical Comments:

The study is technically limited due to poor acoustic windows.  The

study is technically limited due to the patient's history of COPD.  

 

Left Ventricle:

The left ventricular chamber size is normal. There is increased basal

septal hypertrophy noted without evidence of an increased gradient

across the left ventricular outflow tract.  Global left ventricular wall

motion and contractility are within normal limits. There is normal left

ventricular systolic function. The estimated ejection fraction is

55-60%.  There is a left ventricular septal wall motion abnormality

observed, possibly due to the presence of a left bundle branch block.

The assessment of diastolic function is non-diagnostic. 

 

Left Atrium:

The left atrial chamber size is normal. 

 

Right Ventricle:

The right ventricle is not well visualized. The right ventricular cavity

size is normal. The right ventricular global systolic function is

normal. 

 

Right Atrium:

The right atrium is not well visualized. 

 

Aortic Valve:

The aortic valve is trileaflet. The aortic valve leaflets are mildly

thickened. There is no evidence of aortic regurgitation. There is no

evidence of aortic stenosis. 

 

Mitral Valve:

The mitral valve leaflets are mildly thickened. There is a trace of

mitral regurgitation. There is no evidence of mitral stenosis. 

 

Tricuspid Valve:

The tricuspid valve leaflets are normal.  There is no evidence of

tricuspid valve regurgitation. There is no tricuspid stenosis. 

 

Pulmonic Valve:

The pulmonic valve appears normal. There is no evidence of pulmonic

regurgitation. There is no pulmonic stenosis.  

 

Pericardium:

There is no significant pericardial effusion. 

 

Aorta:

There is no dilatation of the ascending aorta. There is no dilatation of

the aortic arch. There is no dilation of the aortic root. 

 

Pulmonary Artery:

The main pulmonary artery appears normal. 

 

Venous:

The inferior vena cava appears normal in size. There is a greater than

50% respiratory change in the inferior vena cava dimension. 

 

Contrast:

Definity was used to optimize study.  A total of 3 ml of diluted

Definity was given IV. 

 

Summary:

There was not any prior study for comparison. 

 

Conclusions

Global left ventricular wall motion and contractility are within normal

limits.

The estimated ejection fraction is 55-60%. 

There is a left ventricular septal wall motion abnormality observed,

possibly due to the presence of a left bundle branch block.

There is increased basal septal hypertrophy noted without evidence of an

increased gradient across the left ventricular outflow tract. 

There is no evidence of aortic stenosis.

There is a trace of mitral regurgitation.

There is no evidence of tricuspid valve regurgitation.

There is no significant pericardial effusion.

 

Measurements     

Name                    Value         Normal Range            

IVSd (2D)               1.2 cm        (0.6 - 1)               

LVPWd (2D)              0.9 cm        (0.6 - 1)               

LVIDd (2D)              4.6 cm        (3.6 - 5.4)             

Aortic Annulus          2.4 cm        (1.4 - 2.6)             

Ao root diameter (2D)   3.4 cm        (2.1 - 3.5)             

Ascending Ao            3.4 cm        (2.1 - 3.4)             

Aortic arch             2.7 cm        (1.8 - 3.4)             

LA dimension (AP) 2D    3.6 cm        (2.3 - 3.8)             

LAd ISD 4CH             4.8 cm        (2.9 - 5.3)             

LA ISD 4CH W            4.3 cm        (2.5 - 4.5)             

 

Name                    Value         Normal Range            

LA ESV BP (A/L) index   24 ml/m2      -                        

 

Name                    Value         Normal Range            

MV E-wave Vmax          0.78 m/sec    -                        

MV deceleration time    243 msec      -                        

MV A-wave Vmax          0.59 m/sec    -                        

MV E:A ratio            1.3 ratio     -                        

LV lateral e' Vmax      0.13 m/sec    -                        

LV E:e' lateral ratio   6 ratio       -                        

 

Name                    Value         Normal Range            

AV Vmax                 1.5 m/sec     -                        

AV VTI                  28.2 cm       -                        

AV peak gradient        9 mmHg        -                        

AV mean gradient        5 mmHg        -                        

LVOT Vmax               1.1 m/sec     -                        

LVOT VTI                21.4 cm       -                        

LVOT peak gradient      5 mmHg        -                        

LVOT mean gradient      3 mmHg        -                        

ELIJAH Vmax                0.99 m/sec    -                        

 

Name                    Value         Normal Range            

IVC diameter            1.4 cm        -                        

 

Name                    Value         Normal Range            

PV Vmax                 1 m/sec       -                        

 

Electronically signed by: Nando Randolph MD on 10/30/2018 16:38:27

## 2018-10-30 NOTE — CONS
CC:  Dr. Pb De Santiago, [place], [place]; Dr. Matthew *

 

CARDIOLOGY CONSULTATION:

 

DATE OF CONSULT:  10/30/18

 

INDICATION FOR CONSULT:  Leg fracture, abnormal EKG.

 

HISTORY OF PRESENT ILLNESS:  The patient is a 68-year-old gentleman with a 
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 
site and sustained a left leg fracture.  He is going to the operating room 
tomorrow for pinning of his leg fracture.

 

The patient's EKG today demonstrates normal sinus rhythm with intraventricular 
conduction delay and T-wave flattening.

 

In speaking with the patient, I cannot elicit any cardiac symptoms.  The 
patient is very active for a 68-year-old.  He still works construction and has 
no symptoms of chest pain, shortness of breath.  No orthopnea or PND.  No 
palpitations.  No lightheadedness, dizziness, or syncope.

 

The patient had a knee replacement surgery this past summer.  At that time, an 
EKG was done in [place] on 07/24/18, which showed the exact same EKG 
abnormalities. At that time, a Cardiology evaluation was reportedly 
unremarkable.  The patient underwent surgery without difficulty.

 

PAST MEDICAL HISTORY:  Significant only for COPD.

 

MEDICATIONS:  None.

 

ALLERGIES:  None.

 

SOCIAL HISTORY:  Works as a .  He does smoke cigars.  He 
drinks 2 to 3 beers a day.  He lives with his wife.

 

REVIEW OF SYSTEMS:  Negative for fevers and chills.  Negative for changes in 
his bowel or bladder habits.  Negative for change in weight.  Other 12-point 
review is unremarkable.

 

PHYSICAL EXAM:  Height is 5 feet 10 inches, weight is 137 pounds, temperature 
98.5, heart rate is 65, blood pressure 116/71, respiratory rate is 16, oxygen 
saturation 98% on room air.  Sclerae anicteric.  Oropharynx is pink without 
erythema. Carotids are 2+ without bruits.  JVD is normal.  Thyroid is normal.  
Cardiac Exam: S1, S2 without any murmurs, rubs, or gallops.  Lungs are clear to 
auscultation bilaterally.  There is no dullness to percussion.  Abdomen is soft
, nontender, nondistended with normoactive bowel sounds.  Extremities show no 
edema.  He does have a large cast on his left leg.  The patient is awake, alert
, and oriented.  He moves his upper extremities equally.

 

LABORATORY DATA:  CBC:  White count 5, hemoglobin 10, hematocrit 27, platelet 
count 160.  Chemistries within normal limits.

 

IMPRESSION AND PLAN:  This is a 68-year-old gentleman, who sustained a leg 
fracture after a fall at work, who came to the hospital.  He is scheduled to go 
to the operating room tomorrow.

 

His EKG is abnormal as described above, although there is no change from his 
EKG in July.

 

The patient's echocardiogram shows normal LV size with systolic function and no 
significant valvular abnormalities.

 

For now, I think the patient can proceed to the operating room.  I think the 
patient is at low risk for cardiovascular complications.  I do not think any 
medication changes are necessary.

 

Thank you very much for allowing me to participate in the care of this patient.

 

 [id number]/CPS #: [id number]

AGUSTIN

## 2018-10-31 LAB
BASOPHILS # BLD AUTO: 0 10^3/UL (ref 0–0.2)
EOSINOPHIL # BLD AUTO: 0.2 10^3/UL (ref 0–0.6)
HCT VFR BLD AUTO: 25 % (ref 42–52)
HGB BLD-MCNC: 9.6 G/DL (ref 14–18)
INR PPP/BLD: 1.06 (ref 0.77–1.02)
LYMPHOCYTES # BLD AUTO: 0.8 10^3/UL (ref 1–4.8)
MCH RBC QN AUTO: 37 PG (ref 27–31)
MCHC RBC AUTO-ENTMCNC: 38 G/DL (ref 31–36)
MCV RBC AUTO: 98 FL (ref 80–94)
MONOCYTES # BLD AUTO: 0.6 10^3/UL (ref 0–0.8)
NEUTROPHILS # BLD AUTO: 3.9 10^3/UL (ref 1.5–7.7)
NRBC # BLD AUTO: 0 10^3/UL
NRBC BLD QL AUTO: 0.1
PLATELET # BLD AUTO: 156 10^3/UL (ref 150–450)
RBC # BLD AUTO: 2.58 10^6/UL (ref 4–5.4)
WBC # BLD AUTO: 5.6 10^3/UL (ref 3.5–10.8)

## 2018-10-31 PROCEDURE — 0QHB35Z INSERTION OF EXTERNAL FIXATION DEVICE INTO RIGHT LOWER FEMUR, PERCUTANEOUS APPROACH: ICD-10-PCS | Performed by: ORTHOPAEDIC SURGERY

## 2018-10-31 RX ADMIN — THERA TABS SCH TAB: TAB at 11:01

## 2018-10-31 RX ADMIN — CEFAZOLIN SCH MLS/HR: 1 INJECTION, POWDER, FOR SOLUTION INTRAVENOUS at 16:36

## 2018-10-31 RX ADMIN — DOCUSATE SODIUM SCH MG: 100 CAPSULE, LIQUID FILLED ORAL at 11:00

## 2018-10-31 RX ADMIN — Medication SCH MG: at 11:01

## 2018-10-31 RX ADMIN — OXYCODONE HYDROCHLORIDE PRN MG: 5 CAPSULE ORAL at 11:01

## 2018-10-31 RX ADMIN — FOLIC ACID SCH MG: 1 TABLET ORAL at 11:01

## 2018-10-31 RX ADMIN — MORPHINE SULFATE PRN MG: 4 INJECTION INTRAVENOUS at 03:23

## 2018-10-31 RX ADMIN — DOCUSATE SODIUM SCH MG: 100 CAPSULE, LIQUID FILLED ORAL at 20:36

## 2018-10-31 NOTE — PN
Progress Note





- Progress Note


Date of Service: 10/31/18


SOAP: 


Subjective:


[] Patient was seen at bedside s/p placement of external fixator for a left 

bicondylar tibial plateau fracture earlier today. His pain is much improved 

from his preoperative state and he has no complaints. 





Objective:


[]General: NAD 


LLE: External fixator in place. There is moderate swelling of the knee and 

proximal lower leg. All compartments of the left upper and lower leg are 

compressible and nontender to palpation. DP2+, capillary refill less than two 

seconds distally, sensation intact to light touch throughout the left foot. 

Flexion and extension of ankle and MTPs intact





Assessment:


[]POD 0 s/p Placement of external fixator for a left bicondylar tibial plateau 

fracture 








Plan:


[]NWB LLE 


PT/OT


lovenox 40 mg sq qd starting 11/1 at noon and hold for surgery 11/8


3 doses post op IV ancef. When complete will continue keflex until follow up 

ORIF planned for 11/8.

## 2018-10-31 NOTE — OP
Operative Report - Blank





- Operative Report


Date of Operation: 10/31/18


Note: 


PATIENT: Ramone Collier





YOB: 1950





DATE OF SURGERY: 10/31/2018





SURGEON: Irving Duran MD





ASSISTANT: EMMY Leong, whos assistance was necessary for positioning, 

traction, and help with instrumentation.





ANESTHESIOLOGIST: Dr. Galvan





PREOPERATIVE DIAGNOSIS: Left bicondylar tibial plateau fracture





POSTOPERATIVE DIAGNOSIS: Left bicondylar tibial plateau fracture





OPERATION: Placement of external fixator for a left bicondylar tibial plateau 

fracture 





ANESTHESIA: GETA





IMPLANTS: Synthes external fixator





TOURNIQUET TIME: none





SPECIMENS: none





ESTIMATED BLOOD LOSS: minimal





COMPLICATIONS: none





STATUS: Stable from the operating room to the recovery room and then to the 

hospital floor.





INDICATIONS FOR PROCEDURE:


Ramone sustained a closed, displaced left bicondylar tibial plateau fracture. 

He is quite swollen so we discussed placement of an ex-fix and staging his ORIF 

once swelling improves. Both operative and non operative treatment alternatives 

were reviewed. Further, the nature and risks of surgery were reviewed in 

careful detail. Our discussions regarding the risks of surgery included, but 

were not limited to, infection, wound problems, nerve injury, neuroma, RSD, 

persistent symptoms, blood clot, failure of the surgery, need for further 

surgery, compartment syndrome, and even the remote chance of catastrophic 

complication, including loss of limb or death.





DESCRIPTION OF PROCEDURE:


The patient was seen in the preoperative holding unit and informed written 

consent was obtained.  The appropriate extremity was marked. The patient was 

then brought to the operating room and carefully positioned on the operating 

room table. Anesthesia was induced. All bony prominences were padded with great 

care. A chlorhexidine based pre-scrub was performed followed by a chloraprep 

prep and drape in standard sterile fashion. A surgical safety pause was then 

conducted in which we confirmed the appropriate patient, extremity, planned 

procedure, availability of equipment, indication and administration of 

prophylactic antibiotics, and DVT prophylaxis in the form of a compression boot 

on the non-surgical extremity. 





I made two stab incisions at the anterolateral thigh.  I used a straight clamp 

to bluntly dissect down to the femur.  I then predrilled two holes for the 

external fixation pins.  Two partially-threaded external fixation pins were 

then screwed into the femur.  These had excellent purchase.  Fluoroscopy was 

used to confirm placement.  I then made two stab incisions at the anterior leg.

  I used a straight clamp to bluntly dissect down to the tibia.  I then 

predrilled two holes for the external fixation pins.  Two partially-threaded 

external fixation pins were then screwed into the tibia.  These had excellent 

purchase.  Fluoroscopy was used to confirm placement.  I then used the external 

fixation connectors and rods to span between the two sets of pins.  I then 

pulled axial traction with slight flexion of the knee, also correcting coronal 

alignment and rotation.  The bolts were then tightened.  Fluoroscopy was then 

used to confirm alignment at the fracture. The pin sites were then dressed with 

Xeroform, sterile gauze, sterile Kerlix, and sterile Ace wrap.





The patient was then awakened from anesthesia and transferred to the recovery 

room in stable condition.  There were no complications.  All needle and sponge 

counts were correct at the end of the case.





ATTESTATION: I attest I was present and scrubbed and performed the critical 

portions of the procedure myself.





POSTOPERATIVE PLAN: The plan is for elevation, icing, and non-weight-bearing of 

the extremity. The plan is for chemical DVT prophylaxis.  We will plan to 

perform an open reduction and internal fixation of the fractures in a staged 

manner once soft tissue swelling has improved.

## 2018-10-31 NOTE — PN
Subjective


Date of Service: 10/31/18


Interval History: 








Pt reports he is doing well and feels good post-op.  Reports pain is 

controlled. no fever. 











Objective


Active Medications: 








Acetaminophen (Tylenol Tab*)  650 mg PO Q6H PRN


   PRN Reason: pain/fever


   Last Admin: 10/29/18 21:38 Dose:  650 mg


Cephalexin HCl (Keflex Cap*)  500 mg PO Q6H [facility]


Docusate Sodium (Colace Cap*)  100 mg PO BID [facility]


   Last Admin: 10/31/18 11:00 Dose:  100 mg


Enoxaparin Sodium (Lovenox(*))  40 mg SUBCUT Q24H [facility]


Folic Acid (Folvite Tab*)  1 mg PO DAILY [facility]


   Last Admin: 10/31/18 11:01 Dose:  1 mg


Cefazolin Sodium 1 gm/ Sodium (Chloride)  50 mls @ 200 mls/hr IVPB Q8H [facility]


   Stop: 18 08:14


Lorazepam (Ativan Tab(*))  0 - 6 mg PO .PER [place] PROTOCOL [facility]; Protocol


Morphine Sulfate (Morphine Vial*)  4 mg IV Q4H PRN


   PRN Reason: PAIN


   Last Admin: 10/31/18 03:23 Dose:  4 mg


Multivitamins/Minerals (Theragran/Minerals Tab*)  1 tab PO DAILY [facility]


   Last Admin: 10/31/18 11:01 Dose:  1 tab


Oxycodone HCl (Roxycodone Tab*)  5 mg PO Q4H PRN


   PRN Reason: .


   Last Admin: 10/31/18 11:01 Dose:  5 mg


Prochlorperazine Edisylate (Compazine Inj*)  5 mg IV Q6H PRN


   PRN Reason: NAUSEA/VOMITING


Thiamine HCl (Vitamin B-1 Tab*)  100 mg PO DAILY [facility]


   Last Admin: 10/31/18 11:01 Dose:  100 mg








 Vital Signs - 8 hr











  10/31/18 10/31/18 10/31/18





  08:36 08:37 08:40


 


Temperature  98.6 F 


 


Pulse Rate 70 73 75


 


Respiratory  14 13





Rate   


 


Blood Pressure  111/77 112/70





(mmHg)   


 


O2 Sat by Pulse 98 98 97





Oximetry   














  10/31/18 10/31/18 10/31/18





  08:45 08:50 08:55


 


Temperature   


 


Pulse Rate 68 68 79


 


Respiratory 13 14 14





Rate   


 


Blood Pressure 112/73 116/81 117/72





(mmHg)   


 


O2 Sat by Pulse 98 97 99





Oximetry   














  10/31/18 10/31/18 10/31/18





  09:00 09:01 09:15


 


Temperature   


 


Pulse Rate 75 99 82


 


Respiratory 15 18 16





Rate   


 


Blood Pressure 130/80  116/71





(mmHg)   


 


O2 Sat by Pulse 99 94 95





Oximetry   














  10/31/18 10/31/18 10/31/18





  09:30 10:10 10:20


 


Temperature  98.0 F 


 


Pulse Rate 68 103 


 


Respiratory 14 16 16





Rate   


 


Blood Pressure 115/72 99/73 





(mmHg)   


 


O2 Sat by Pulse 97 98 





Oximetry   














  10/31/18 10/31/18 10/31/18





  11:01 11:16 12:39


 


Temperature  97.9 F 97.5 F


 


Pulse Rate  82 78


 


Respiratory 16 16 17





Rate   


 


Blood Pressure  100/59 101/66





(mmHg)   


 


O2 Sat by Pulse  99 99





Oximetry   














  10/31/18





  14:42


 


Temperature 97.5 F


 


Pulse Rate 84


 


Respiratory 16





Rate 


 


Blood Pressure 100/60





(mmHg) 


 


O2 Sat by Pulse 100





Oximetry 











Oxygen Devices in Use Now: Nasal Cannula


Appearance: A+O x3 in NAD


Eyes: No Scleral Icterus, PERRLA


Neck: NL Appearance and Movements; NL JVP


Respiratory: Symmetrical Chest Expansion and Respiratory Effort, Clear to 

Auscultation


Cardiovascular: NL Sounds; No Murmurs; No JVD, RRR, No Edema


Abdominal: NL Sounds; No Tenderness; No Distention


Extremities: - - + sensation


Skin: No Rash or Ulcers, No Nodules or Sclerosis


Neurological: Alert and Oriented x 3


Lines/Tubes/Other Access: Clean, Dry and Intact Peripheral IV


Nutrition: Taking PO's


Result Diagrams: 


 10/31/18 05:57





 10/31/18 05:57


Diagnostic Imaging: 





Patient Name:                    DAMASO TANG                              

                                                                            

Medical Record#: [id number]


Ordering Physician: Yahaira Cummings MD                                            

                                                                            

Acct.#: [id number]


:         1950                    Age: 68   Sex: M                    

                                                                            

Location: EMERGENCY DEPARTMENT


Exam Date: 10/29/18 1140                                                       

                                                                            ADM 

Status: REG ER


Order Information:                                               KNEE LEFT 1-2 

VWS


Accession Number:                                                [id number]


CPT:                                                             15247


INDICATION: Left knee injury.





TECHNIQUE: 2 views of the left knee were obtained.


  


FINDINGS:  There is diffuse soft tissue swelling. There is a moderate joint 

effusion


present.  There is a transverse comminuted intra-articular fracture of the 

tibial


metaphysis with extension to the lateral articular surface. The fracture 

fragments are


impacted and slightly distracted. There is also an oblique slightly displaced 

comminuted


fracture of the proximal fibular metaphysis.





IMPRESSION:  


1. COMMINUTED DISPLACED INTRA-ARTICULAR FRACTURE OF THE PROXIMAL TIBIA.


2. OBLIQUE COMMINUTED SLIGHTLY DISPLACED FRACTURE OF THE PROXIMAL FIBULA.





____________________________________________________________


<Electronically signed by Kenny Goncalves MD in [place]>  10/29/18 123


Dictated By: Kenny Goncalves MD


Dictated Date/Time: 10/29/18 1239


Transcribed Date/Time: 10/29/18 1237


Copy to:














Assess/Plan/Problems-Billing


Assessment: 





This is a 68 year old male with no significant reported medical history that 

presented to the ED after falling off a ladder at work and sustaining a left 

comminuted tib-fib fracture.





- Patient Problems


(1) Tibia/fibula fracture


Comment: 


 - Dispo Orthopedics POD #0 


- s/p placement of external fixator for a left bicondylar tibial plateau 

fracture 


 - Pain control, bowel regimen


-  NWB, PT/OT eval


- Per Ortho plan to take him back to the OR next thursday    





(2) Alcohol use


Comment: 


 - Per patient, he drinks 2, 24 ounce beers daily, per wife this may be 

minimized


 - Continue [facility] protocol and monitor for detox   





(3) Full code status








(4) DVT prophylaxis


Comment: 


 - HSQ   


Status and Disposition: 





Inpatient. Discharge disposition to be determined.

## 2018-10-31 NOTE — RAD
CPT II Codes: 





INDICATION: Left tibial plateau fracture.



Fluoroscopic services provided for referring physician. 6 spot images demonstrates

intraoperative control films for repair of the tibial plateau fracture.



IMPRESSION: Fluoroscopic services provided for referring physician.

## 2018-11-01 LAB
BASOPHILS # BLD AUTO: 0 10^3/UL (ref 0–0.2)
EOSINOPHIL # BLD AUTO: 0.1 10^3/UL (ref 0–0.6)
HCT VFR BLD AUTO: 23 % (ref 42–52)
HGB BLD-MCNC: 8.4 G/DL (ref 14–18)
LYMPHOCYTES # BLD AUTO: 1 10^3/UL (ref 1–4.8)
MCH RBC QN AUTO: 37 PG (ref 27–31)
MCHC RBC AUTO-ENTMCNC: 37 G/DL (ref 31–36)
MCV RBC AUTO: 99 FL (ref 80–94)
MONOCYTES # BLD AUTO: 0.7 10^3/UL (ref 0–0.8)
NEUTROPHILS # BLD AUTO: 4.7 10^3/UL (ref 1.5–7.7)
NRBC # BLD AUTO: 0 10^3/UL
NRBC BLD QL AUTO: 0.1
PLATELET # BLD AUTO: 155 10^3/UL (ref 150–450)
RBC # BLD AUTO: 2.28 10^6/UL (ref 4–5.4)
WBC # BLD AUTO: 6.5 10^3/UL (ref 3.5–10.8)

## 2018-11-01 RX ADMIN — OXYCODONE HYDROCHLORIDE PRN MG: 5 CAPSULE ORAL at 21:48

## 2018-11-01 RX ADMIN — CEPHALEXIN SCH MG: 500 CAPSULE ORAL at 14:17

## 2018-11-01 RX ADMIN — Medication SCH MG: at 08:15

## 2018-11-01 RX ADMIN — THERA TABS SCH TAB: TAB at 08:15

## 2018-11-01 RX ADMIN — CEFAZOLIN SCH MLS/HR: 1 INJECTION, POWDER, FOR SOLUTION INTRAVENOUS at 00:40

## 2018-11-01 RX ADMIN — FOLIC ACID SCH MG: 1 TABLET ORAL at 08:15

## 2018-11-01 RX ADMIN — DOCUSATE SODIUM SCH MG: 100 CAPSULE, LIQUID FILLED ORAL at 08:15

## 2018-11-01 RX ADMIN — ENOXAPARIN SODIUM SCH MG: 40 INJECTION SUBCUTANEOUS at 11:44

## 2018-11-01 RX ADMIN — OXYCODONE HYDROCHLORIDE PRN MG: 5 CAPSULE ORAL at 08:15

## 2018-11-01 RX ADMIN — CEPHALEXIN SCH MG: 500 CAPSULE ORAL at 20:29

## 2018-11-01 RX ADMIN — CEFAZOLIN SCH MLS/HR: 1 INJECTION, POWDER, FOR SOLUTION INTRAVENOUS at 08:12

## 2018-11-01 RX ADMIN — DOCUSATE SODIUM SCH MG: 100 CAPSULE, LIQUID FILLED ORAL at 20:30

## 2018-11-01 NOTE — PN
Progress Note





- Progress Note


Date of Service: 11/01/18


SOAP: 


Subjective:


[]Patient seen and examined at bedside. He feels quite well this morning 

without complaints. His LLE pain is well controlled and denies any numbness or 

tingling. Denies chest pain, shortness of breath, dizziness, nausea. 





Objective:


[]General: Sitting comfortably in bed, NAD 


LLE: External fixator in place. Moderate swelling of the knee and proximal 

lower leg which has decreased slightly from yesterday, no erythema. All 

compartments of the left upper and lower leg are compressible and nontender to 

palpation. DP2+, capillary refill less than two seconds distally, sensation 

intact to light touch throughout the left foot. Flexion and extension of ankle 

and MTPs intact. 


Calves are supple and nontender.





Assessment:


[]POD 1 s/p ex-fix placement for left bicondylar tibial plateau fracture 





Plan:


[]NWB LLE 


PT/OT


Lovenox 40 mg sq qd starting 11/1 at noon and hold for surgery 11/8


When 3 doses post op IV ancef are complete start keflex until follow up ORIF 

planned for 11/8.


I anticipate he will be discharged to home tomorrow as long as he meets PT 

goals and can ambulate safely


Continue ice and elevation of the LLE.





 Vital Signs











Temp  99.0 F   11/01/18 07:26


 


Pulse  81   11/01/18 07:26


 


Resp  18   11/01/18 10:06


 


BP  113/65   11/01/18 07:26


 


Pulse Ox  97   11/01/18 07:26








 Intake & Output











 10/31/18 11/01/18 11/01/18





 18:59 06:59 18:59


 


Intake Total 1880  


 


Output Total 350 800 450


 


Balance 1530 -800 -450


 


Intake:   


 


  IV Fluids 950  


 


      


 


  Oral 930  


 


Output:   


 


  Urine 350 800 450


 


Other:   


 


  Estimated Void  Large 


 


  # Voids 3  








 Laboratory Last Values











WBC  6.5 10^3/ul (3.5-10.8)   11/01/18  06:05    


 


RBC  2.28 10^6/ul (4.00-5.40)  L  11/01/18  06:05    


 


Hgb  8.4 g/dl (14.0-18.0)  L  11/01/18  06:05    


 


Hct  23 % (42-52)  L  11/01/18  06:05    


 


MCV  99 fL (80-94)  H  11/01/18  06:05    


 


MCH  37 pg (27-31)  H  11/01/18  06:05    


 


MCHC  37 g/dl (31-36)  H  11/01/18  06:05    


 


RDW  15 % (10.5-15)   11/01/18  06:05    


 


Plt Count  155 10^3/ul (150-450)   11/01/18  06:05    


 


MPV  8.2 um3 (7.4-10.4)   11/01/18  06:05    


 


Neut % (Auto)  72.5 % (38-83)   11/01/18  06:05    


 


Lymph % (Auto)  14.7 % (25-47)  L  11/01/18  06:05    


 


Mono % (Auto)  10.5 % (0-7)  H  11/01/18  06:05    


 


Eos % (Auto)  2.1 % (0-6)   11/01/18  06:05    


 


Baso % (Auto)  0.2 % (0-2)   11/01/18  06:05    


 


Absolute Neuts (auto)  4.7 10^3/ul (1.5-7.7)   11/01/18  06:05    


 


Absolute Lymphs (auto)  1.0 10^3/ul (1.0-4.8)   11/01/18  06:05    


 


Absolute Monos (auto)  0.7 10^3/ul (0-0.8)   11/01/18  06:05    


 


Absolute Eos (auto)  0.1 10^3/ul (0-0.6)   11/01/18  06:05    


 


Absolute Basos (auto)  0 10^3/ul (0-0.2)   11/01/18  06:05    


 


Absolute Nucleated RBC  0 10^3/ul  11/01/18  06:05    


 


Nucleated RBC %  0.1   11/01/18  06:05    


 


INR (Anticoag Therapy)  1.06  (0.77-1.02)  H  10/31/18  05:57    


 


APTT  27.6 seconds (26.0-36.3)   10/29/18  13:44    


 


Sodium  138 mmol/L (135-145)   11/01/18  06:05    


 


Potassium  4.2 mmol/L (3.5-5.0)   11/01/18  06:05    


 


Chloride  107 mmol/L (101-111)   11/01/18  06:05    


 


Carbon Dioxide  28 mmol/L (22-32)   11/01/18  06:05    


 


Anion Gap  3 mmol/L (2-11)   11/01/18  06:05    


 


BUN  15 mg/dL (6-24)   11/01/18  06:05    


 


Creatinine  0.70 mg/dL (0.67-1.17)   11/01/18  06:05    


 


Est GFR ( Amer)  134.9  (>60)   11/01/18  06:05    


 


Est GFR (Non-Af Amer)  111.5  (>60)   11/01/18  06:05    


 


BUN/Creatinine Ratio  21.4  (8-20)  H  11/01/18  06:05    


 


Glucose  99 mg/dL ()   11/01/18  06:05    


 


Calcium  8.4 mg/dL (8.6-10.3)  L  11/01/18  06:05    


 


Total Bilirubin  1.80 mg/dL (0.2-1.0)  H  10/29/18  13:44    


 


AST  24 U/L (13-39)   10/29/18  13:44    


 


ALT  24 U/L (7-52)   10/29/18  13:44    


 


Alkaline Phosphatase  59 U/L ()   10/29/18  13:44    


 


Total Protein  6.2 g/dL (6.4-8.9)  L  10/29/18  13:44    


 


Albumin  3.8 g/dL (3.2-5.2)   10/29/18  13:44    


 


Globulin  2.4 g/dL (2-4)   10/29/18  13:44    


 


Albumin/Globulin Ratio  1.6  (1-3)   10/29/18  13:44    


 


Urine Color  Yellow   10/29/18  13:41    


 


Urine Appearance  Clear   10/29/18  13:41    


 


Urine pH  5.0  (5-9)   10/29/18  13:41    


 


Ur Specific Gravity  1.018  (1.010-1.030)   10/29/18  13:41    


 


Urine Protein  Negative  (Negative)   10/29/18  13:41    


 


Urine Ketones  Negative  (Negative)   10/29/18  13:41    


 


Urine Blood  Negative  (Negative)   10/29/18  13:41    


 


Urine Nitrate  Negative  (Negative)   10/29/18  13:41    


 


Urine Bilirubin  Negative  (Negative)   10/29/18  13:41    


 


Urine Urobilinogen  Negative  (Negative)   10/29/18  13:41    


 


Ur Leukocyte Esterase  Negative  (Negative)   10/29/18  13:41    


 


Urine Glucose  Negative  (Negative)   10/29/18  13:41    


 


Blood Type  O Negative   10/30/18  06:43    


 


Antibody Screen  Negative   10/30/18  06:43

## 2018-11-01 NOTE — PN
Subjective


Date of Service: 18


Interval History: 





Patient seen and examined. States he is feeling well, had BM today. Pain in LLE 

well controlled. Remains NWB. Denies chest pain, no SOB, no fevers or chills.





Objective


Active Medications: 








Acetaminophen (Tylenol Tab*)  650 mg PO Q6H PRN


   PRN Reason: pain/fever


   Last Admin: 10/29/18 21:38 Dose:  650 mg


Cephalexin HCl (Keflex Cap*)  [facility] PO [facility]H [facility]


   Last Admin: 18 14:17 Dose:  500 mg


Docusate Sodium (Colace Cap*)  100 mg PO BID [facility]


   Last Admin: 18 08:15 Dose:  100 mg


Enoxaparin Sodium (Lovenox(*))  40 mg SUBCUT Q24H [facility]


   Last Admin: 18 11:44 Dose:  40 mg


Folic Acid (Folvite Tab*)  1 mg PO DAILY [facility]


   Last Admin: 18 08:15 Dose:  1 mg


Lorazepam (Ativan Tab(*))  0 - 6 mg PO .PER [facility] PROTOCOL [facility]; Protocol


Morphine Sulfate (Morphine Vial*)  4 mg IV Q4H PRN


   PRN Reason: PAIN


   Last Admin: 10/31/18 03:23 Dose:  4 mg


Multivitamins/Minerals (Theragran/Minerals Tab*)  1 tab PO DAILY [facility]


   Last Admin: 18 08:15 Dose:  1 tab


Oxycodone HCl (Roxycodone Tab*)  5 mg PO Q4H PRN


   PRN Reason: .


   Last Admin: 18 08:15 Dose:  5 mg


Prochlorperazine Edisylate (Compazine Inj*)  5 mg IV Q6H PRN


   PRN Reason: NAUSEA/VOMITING


Thiamine HCl (Vitamin B-1 Tab*)  100 mg PO DAILY [facility]


   Last Admin: 18 08:15 Dose:  100 mg








 Vital Signs - 8 hr











  18





  11:25 15:13


 


Temperature 98.2 F 98.0 F


 


Pulse Rate 88 82


 


Respiratory 14 16





Rate  


 


Blood Pressure 94/62 107/61





(mmHg)  


 


O2 Sat by Pulse 97 100





Oximetry  











Oxygen Devices in Use Now: None


Appearance: alert, NAD


Eyes: No Scleral Icterus, PERRLA


Ears/Nose/Mouth/Throat: Clear Oropharnyx, Mucous Membranes Moist


Neck: NL Appearance and Movements; NL JVP, Trachea Midline


Respiratory: Symmetrical Chest Expansion and Respiratory Effort, Clear to 

Auscultation


Cardiovascular: NL Sounds; No Murmurs; No JVD, RRR


Abdominal: NL Sounds; No Tenderness; No Distention


Extremities: No Clubbing, Cyanosis, - - RLE diffuse edema, good pulse


Skin: No Rash or Ulcers


Neurological: Alert and Oriented x 3, NL Sensation


Nutrition: Taking PO's


Result Diagrams: 


 18 06:05





 18 06:05


Diagnostic Imaging: 





Patient Name:                    DAMASO TANG                              

                                                                            

Medical Record#: [id number]


Ordering Physician: Yahaira Cummings MD                                            

                                                                            

Acct.#: [id number]


:         1950                    Age: 68   Sex: M                    

                                                                            

Location: EMERGENCY DEPARTMENT


Exam Date: 10/29/18 114                                                       

                                                                            ADM 

Status: REG ER


Order Information:                                               KNEE LEFT 1-2 

VWS


Accession Number:                                                [id number]


CPT:                                                             [id number]


INDICATION: Left knee injury.





TECHNIQUE: 2 views of the left knee were obtained.


  


FINDINGS:  There is diffuse soft tissue swelling. There is a moderate joint 

effusion


present.  There is a transverse comminuted intra-articular fracture of the 

tibial


metaphysis with extension to the lateral articular surface. The fracture 

fragments are


impacted and slightly distracted. There is also an oblique slightly displaced 

comminuted


fracture of the proximal fibular metaphysis.





IMPRESSION:  


1. COMMINUTED DISPLACED INTRA-ARTICULAR FRACTURE OF THE PROXIMAL TIBIA.


2. OBLIQUE COMMINUTED SLIGHTLY DISPLACED FRACTURE OF THE PROXIMAL FIBULA.





____________________________________________________________


<Electronically signed by Kenny Goncalves MD in [place]>  10/29/18 1239


Dictated By: Kenny Goncalves MD


Dictated Date/Time: 10/29/18 1239


Transcribed Date/Time: 10/29/18 1237


Copy to:














Assess/Plan/Problems-Billing


Assessment: 





This is a 68 year old male with no significant reported medical history that 

presented to the ED after falling off a ladder at work and sustaining a left 

comminuted tib-fib fracture.





- Patient Problems


(1) Tibia/fibula fracture


Code(s): S82.209A - UNSP FRACTURE OF SHAFT OF UNSP TIBIA, INIT FOR CLOS FX; 

S82.409A - UNSP FRACTURE OF SHAFT OF UNSP FIBULA, INIT FOR CLOS FX   SNOMED Code

(s): 916054679


   Comment: 


 - POD1 ex-fix placement for left bicondylar tibial plateau fracture


 - POC as per orthopedics, DVT prophy, pain control, NWB 


 - Plan to go back to OR Thursday if edema is improved for ORIF   





(2) EKG abnormality


Code(s): R94.31 - ABNORMAL ELECTROCARDIOGRAM [ECG] [EKG]   SNOMED Code(s): 

890004475


   Comment: 


 - Resolved   





(3) Alcohol use


Code(s): Z78.9 - OTHER SPECIFIED HEALTH STATUS   SNOMED Code(s): 892354918


   Comment: 


 - DC [facility], no detox noted   





(4) DVT prophylaxis


Code(s): EXM0670 -    SNOMED Code(s): 443464015


   Comment: 


 - lovenox 40mg SQ daily   





(5) Full code status


Code(s): Z78.9 - OTHER SPECIFIED HEALTH STATUS   SNOMED Code(s): 888227413


   


Status and Disposition: 





Inpatient. Discharge disposition to be determined. Patient prepared to go home 

but this may not be feasible given home situation, NWB status and need for 

additional surgery. Patient lives 1.5 hours away and his wife has Parkinson's. 

Will seek assistance from CM if patient can go to rehab or Swing status.

## 2018-11-02 VITALS — SYSTOLIC BLOOD PRESSURE: 120 MMHG | DIASTOLIC BLOOD PRESSURE: 60 MMHG

## 2018-11-02 RX ADMIN — Medication SCH MG: at 07:41

## 2018-11-02 RX ADMIN — OXYCODONE HYDROCHLORIDE PRN MG: 5 CAPSULE ORAL at 14:31

## 2018-11-02 RX ADMIN — DOCUSATE SODIUM SCH MG: 100 CAPSULE, LIQUID FILLED ORAL at 07:41

## 2018-11-02 RX ADMIN — THERA TABS SCH TAB: TAB at 07:41

## 2018-11-02 RX ADMIN — CEPHALEXIN SCH MG: 500 CAPSULE ORAL at 01:55

## 2018-11-02 RX ADMIN — CEPHALEXIN SCH MG: 500 CAPSULE ORAL at 07:41

## 2018-11-02 RX ADMIN — OXYCODONE HYDROCHLORIDE PRN MG: 5 CAPSULE ORAL at 07:41

## 2018-11-02 RX ADMIN — CEPHALEXIN SCH MG: 500 CAPSULE ORAL at 14:31

## 2018-11-02 RX ADMIN — ENOXAPARIN SODIUM SCH MG: 40 INJECTION SUBCUTANEOUS at 11:59

## 2018-11-02 RX ADMIN — FOLIC ACID SCH MG: 1 TABLET ORAL at 07:41

## 2018-11-02 RX ADMIN — OXYCODONE HYDROCHLORIDE PRN MG: 5 CAPSULE ORAL at 01:56

## 2018-11-02 NOTE — PN
Progress Note





- Progress Note


Date of Service: 11/02/18


SOAP: 


Subjective:


[]Patient seen at bedside with wife present.  His pain is well tolerated.  

Waiting to hear if a rehab bed can be arranged vs discharge home.  








Objective:


[]


 Vital Signs











Temp  98.0 F   11/02/18 07:42


 


Pulse  64   11/02/18 07:42


 


Resp  18   11/02/18 09:44


 


BP  114/66   11/02/18 07:42


 


Pulse Ox  98   11/02/18 07:42








 Intake & Output











 11/01/18 11/02/18 11/02/18





 18:59 06:59 18:59


 


Intake Total 600 800 480


 


Output Total 700 1350 200


 


Balance -100 -550 280


 


Intake:   


 


  Oral 600 800 480


 


Output:   


 


  Urine 700 1350 200


 


Other:   


 


  Estimated Void Medium  


 


  # Bowel Movements 0  


 


  Estimated Stool Amount Medium  


 


  # Voids 1  








Left LE moderate edema, knee region with some mild superficial ecchymosis.  


Moving ankle well without calf pain


sensation intact


2+ DP pulse LLE


Moderate bloody dressing on dressings around distal pin sites, new Kerlex and 

ACE applied, proximal pin sites dry, new dressings applied








Assessment:


[]s/p external fixation left tibial plateau fracture POD #2








Plan:


[]NWB LLE


  Elevation and ICE LLE


  Dressing changes prn bloody drainage


  Definitive ORIF wih Dr. Duran next week if swelling improved


  May be discharged from Orthopedic standpoint to home or rehab with readmit 

for ORIF next Thursday 11/8/18.

## 2018-11-03 NOTE — DS
CC:  Dr. Griffiths; Dr. Cummings; Dr. Ben Matthew; Dr. Randolph

 

DISCHARGE SUMMARY:

 

DATE OF ADMISSION:

 

DATE OF DISCHARGE:  11/02/18

 

DISCHARGE DIAGNOSES:

1.  Comminuted displaced intra-articular fracture of the proximal tibia, status 
post external fixation for left bicondylar tibial plateau fracture.

2.  Oblique comminuted slightly displaced fracture of the proximal fibula.

3.  Intraventricular conduction delay with flattening of T-waves, history of.  
No change from EKG done back in July.

 

DISCHARGE MEDICATIONS:  Are as follows:

 

1.  Tylenol 650 mg p.o. q.6 p.r.n.

2.  Cephalexin 500 mg p.o. q.6, dispensed 28 capsules, to last through Thursday.

3.  Colace 200 mg p.o. daily.

4.  Lovenox 40 mg subcu daily, given 5 Lovenox syringes.  To stop the day 
before the patient is supposed to follow up with Surgery on Thursday for a 
repeat surgery.

5.  Multivitamins 1 tab p.o. daily.

6.  Oxycodone 5 mg p.o. q.4 p.r.n., 12 tabs dispensed with 0 refills.

 

HISTORY OF PRESENT ILLNESS/HOSPITAL COURSE:  The patient is a 68-year-old 
 gentleman with history of COPD, who presented to the emergency room, 
brought in by EMS after sustaining a fall.  He mentions that he is a 
 and states that he was on the last 2 rungs on the ladder 
and he stated that he fell sustaining the above injuries.  He then subsequently 
underwent an external fixation placement for left bicondylar tibial plateau 
fracture and given the significant swelling of his lower extremity, an ORIF is 
scheduled for next Thursday.  Prior to his operation, he was also assessed by 
Dr. Randolph, given some abnormality in EKG, namely IVCD with T-wave flattening, 
which was also present from an EKG back done last July.

 

He has done well perioperatively and he was advised to follow up and recall his 
PCP within 3 days post discharge and if his symptoms resume or develops new 
ones or feel unwell for any reason, he was advised to call his PCP first and if 
his PCP cannot entertain him due to scheduling issues alone, to call [facility] if the issue is considered nonemergent.  If he needs more 
refills or controlled pain medications, he was advised to call his PCP and/or 
his surgeon's office.  He was advised to call my office regarding any questions
, concerns, or further clarifications regarding his discharge plans and/or 
prescriptions and to take his medications as prescribed.  He was also advised 
by Orthopedics not to bear any weight on the left lower extremity, to keep his 
dressing over pin sites clean, and to continue taking Keflex 500 mg p.o. 4 
times a day until followup surgery next Thursday on 11/08/18, and he was also 
given 5 days of Lovenox and with no Lovenox on Thursday prior to his planned 
ORIF on Thursday.

 

REVIEW OF SYSTEMS:  The patient currently denies any headache, dizziness, fevers
, chills, nausea, vomiting, chest pain, shortness of breath, increased cough and
/or sputum production, abdominal pain, diarrhea, constipation, pain and/or 
increased frequency in urination, myalgias, arthralgias, throat pain, or new 
skin lesions. The rest of the 14-point review of systems are otherwise 
unremarkable.

 

PHYSICAL EXAMINATION:  Shows the most recent vital signs of records with blood 
pressure of 120/60, 98 degrees Fahrenheit, 86 beats per minute heart rate, 16 
per minute respiratory rate, saturating at 98% on room air.  General Appearance
:  The patient is awake, alert, and oriented x3, not in acute distress.  HEENT: 
Normocephalic, atraumatic.  PERRLA.  Extraocular muscles intact.  Negative for 
icterus.  Moist oral mucosa.  Negative throat erythema.  Neck is soft, supple, 
with no cervical lymphadenopathy, no JVD.  Heart:  S1, S2 within normal limits.
  Regular rate and rhythm.  No murmurs, rubs, and gallops.  Chest:  Clear to 
auscultation bilaterally.  Good air entry.  No wheezes, rales, or rhonchi.  
Abdomen is soft, nondistended, nontender.  Normoactive bowel sounds x4 
quadrants.  Extremities:  No cyanosis, clubbing, with left lower extremity 
postoperative edema with CDI and external fixators in place.  Psychiatric:  No 
active psychosis, depression, or suicidal or homicidal ideations.  Skin is warm 
to touch.

 

TIME SPENT:  The total time spent evaluating the patient, reviewing pertinent 
data, and appropriate documentation is 50 minutes.

 

 [id number]/CPS #: [id number]

[facility]

## 2018-11-08 ENCOUNTER — HOSPITAL ENCOUNTER (INPATIENT)
Dept: HOSPITAL 25 - AA | Age: 68
LOS: 4 days | Discharge: HOME HEALTH | DRG: 313 | End: 2018-11-12
Attending: ORTHOPAEDIC SURGERY | Admitting: ORTHOPAEDIC SURGERY
Payer: COMMERCIAL

## 2018-11-08 DIAGNOSIS — M25.561: ICD-10-CM
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
## 2018-11-08 DIAGNOSIS — Z72.89: ICD-10-CM

## 2018-11-08 DIAGNOSIS — Z96.651: ICD-10-CM
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
## 2018-11-08 DIAGNOSIS — F32.9: ICD-10-CM

## 2018-11-08 DIAGNOSIS — Z86.010: ICD-10-CM

## 2018-11-08 DIAGNOSIS — Z87.891: ICD-10-CM

## 2018-11-08 DIAGNOSIS — M19.90: ICD-10-CM

## 2018-11-08 DIAGNOSIS — S82.142A: Primary | ICD-10-CM

## 2018-11-08 DIAGNOSIS — Z87.820: ICD-10-CM

## 2018-11-08 DIAGNOSIS — H91.93: ICD-10-CM

## 2018-11-08 DIAGNOSIS — D64.9: ICD-10-CM

## 2018-11-08 DIAGNOSIS — G89.29: ICD-10-CM

## 2018-11-08 DIAGNOSIS — M25.562: ICD-10-CM

## 2018-11-08 DIAGNOSIS — J44.9: ICD-10-CM

## 2018-11-08 DIAGNOSIS — H54.8: ICD-10-CM

## 2018-11-08 DIAGNOSIS — D58.0: ICD-10-CM

## 2018-11-08 PROCEDURE — 76001: CPT

## 2018-11-08 PROCEDURE — C1713 ANCHOR/SCREW BN/BN,TIS/BN: HCPCS

## 2018-11-08 PROCEDURE — 80048 BASIC METABOLIC PNL TOTAL CA: CPT

## 2018-11-08 PROCEDURE — C1776 JOINT DEVICE (IMPLANTABLE): HCPCS

## 2018-11-08 PROCEDURE — 36415 COLL VENOUS BLD VENIPUNCTURE: CPT

## 2018-11-08 PROCEDURE — 85014 HEMATOCRIT: CPT

## 2018-11-08 PROCEDURE — 88300 SURGICAL PATH GROSS: CPT

## 2018-11-08 PROCEDURE — 85049 AUTOMATED PLATELET COUNT: CPT

## 2018-11-08 PROCEDURE — 0QPHX5Z REMOVAL OF EXTERNAL FIXATION DEVICE FROM LEFT TIBIA, EXTERNAL APPROACH: ICD-10-PCS | Performed by: ORTHOPAEDIC SURGERY

## 2018-11-08 PROCEDURE — 85018 HEMOGLOBIN: CPT

## 2018-11-08 PROCEDURE — 0QSH04Z REPOSITION LEFT TIBIA WITH INTERNAL FIXATION DEVICE, OPEN APPROACH: ICD-10-PCS | Performed by: ORTHOPAEDIC SURGERY

## 2018-11-08 RX ADMIN — CEFAZOLIN SODIUM SCH MLS/HR: 1 SOLUTION INTRAVENOUS at 21:41

## 2018-11-08 RX ADMIN — OXYCODONE HYDROCHLORIDE AND ACETAMINOPHEN PRN TAB: 5; 325 TABLET ORAL at 21:35

## 2018-11-08 RX ADMIN — OXYCODONE HYDROCHLORIDE AND ACETAMINOPHEN PRN TAB: 5; 325 TABLET ORAL at 17:30

## 2018-11-08 RX ADMIN — ACETAMINOPHEN SCH MG: 325 TABLET ORAL at 21:38

## 2018-11-08 RX ADMIN — FERROUS SULFATE TAB 325 MG (65 MG ELEMENTAL FE) SCH MG: 325 (65 FE) TAB at 21:39

## 2018-11-08 RX ADMIN — DOCUSATE SODIUM SCH MG: 100 CAPSULE, LIQUID FILLED ORAL at 21:40

## 2018-11-08 NOTE — OP
Operative Report - Blank





- Operative Report


Date of Operation: 11/08/18


Note: 


PATIENT: Ramone Collier





YOB: 1950





DATE OF SURGERY: 11/8/2018





SURGEON: Irving Duran MD





ASSISTANT: EMMY Leong, whos assistance was necessary for positioning, 

retraction, help with instrumentation, and closure.





ANESTHESIOLOGIST: Dr. Kendrick





PREOPERATIVE DIAGNOSIS: Left bicondylar tibial plateau fracture in external 

fixation





POSTOPERATIVE DIAGNOSIS: Left bicondylar tibial plateau fracture





OPERATION: 


1. Left bicondylar tibial plateau fracture open reduction and internal fixation


2. Removal of left lower extremity external fixator





ANESTHESIA: GETA





IMPLANTS: Synthes proximal tibia plate and screws





TOURNIQUET TIME: Less than 2 hours with a well-padded thigh tourniquet





SPECIMENS: none





ESTIMATED BLOOD LOSS: minimal





COMPLICATIONS: none





STATUS: Stable from the operating room to the recovery room and then to the 

hospital floor.





INDICATIONS FOR PROCEDURE:


Ramone sustained a closed left bicondylar tibial plateau fracture. He was 

placed into an external fixator last week to allow for swelling reduction. Both 

operative and non operative treatment alternatives were reviewed. Further, the 

nature and risks of surgery were reviewed in careful detail. Our discussions 

regarding the risks of surgery included, but were not limited to, infection, 

wound problems, nerve injury, neuroma, RSD, persistent symptoms, blood clot, 

failure of the surgery, posttraumatic arthritis, malunion, nonunion, failure of 

the hardware or surgery, compartment syndrome, and even the remote chance of 

catastrophic complication, including loss of limb or death.





DESCRIPTION OF PROCEDURE:


The patient was seen in the preoperative holding unit and informed written 

consent was obtained.  The appropriate extremity was marked. The patient was 

then brought to the operating room and carefully positioned on the operating 

room table. Anesthesia was induced. All bony prominences were padded with great 

care. A well-padded thigh tourniquet was placed. A chlorhexidine based pre-

scrub was performed followed by a Betadine prep and drape in standard sterile 

fashion. A surgical safety pause was then conducted in which we confirmed the 

appropriate patient, extremity, planned procedure, availability of equipment, 

indication and administration of prophylactic antibiotics, and DVT prophylaxis 

in the form of a compression boot on the non-surgical extremity. 





We began with an Esmarch exsanguination of the limb and inflated the 

tourniquet. An S-shaped longitudinal incision was made laterally at the knee 

and proximal tibia.  I dissected down to the fascial layer.  The tibialis 

anterior was then reflected posteriorly to expose the proximal lateral tibia.  

An arthrotomy was then made at the lateral joint line distal to the lateral 

meniscus.  This provided visualization of the lateral plateau as well as the 

lateral meniscus.  No meniscal tear was appreciated. A 2cm medial incision over 

the medial plateau was then made to aid in reduction. The two large plateau 

fragments were then reduced and held provisionally with a large reduction clamp 

and k-wires. A Synthes anatomic proximal tibia plate was then placed along the 

lateral proximal tibia.  Fluoroscopy was used to confirm placement of the 

plate. I then placed the 3.5 mm screws into the plate and the provisional 

fixation was removed.  Fluoroscopy confirmed maintained reduction of the 

fractures and satisfactory hardware placement.  The meniscocapsular ligaments 

were then repaired to close the arthrotomy. 





The external fixator was then removed with a wrench and T-handle .  The ex

-fix and pin tracts were curetted and irrigated.





Final fluoroscopic images were then obtained.  Both the medial and lateral 

wounds, as well as the ex-fix incisions, were copiously irrigated.  The wounds 

were then closed in a layered fashion utilizing #1 Vicryl for the deep layer, 3-

0 Monocryl for the dermal layer, and skin staples for the skin. A sterile 

dressing was then applied, as well as a Ciaran brace with the knee locked in 

extension.  





The patient was then awakened from anesthesia and transferred to the recovery 

room in stable condition.  There were no complications.  All needle and sponge 

counts were correct at the end of the case.





ATTESTATION: I attest I was present and scrubbed and performed the critical 

portions of the procedure myself.





POSTOPERATIVE PLAN: The plan is to remain touch down weight-bearing for an 

anticipated duration of 2 months. Range of motion as tolerated with the Millard 

brace on.  Otherwise, the Millard should be locked in extension.  The plan is 

for chemical DVT prophylaxis for 6 weeks postoperatively. Follow-up in 2 weeks 

for likely staple removal.

## 2018-11-09 LAB
HCT VFR BLD AUTO: 22 % (ref 42–52)
HGB BLD-MCNC: 7.9 G/DL (ref 14–18)
PLATELET # BLD AUTO: 332 10^3/UL (ref 150–450)

## 2018-11-09 RX ADMIN — DOCUSATE SODIUM SCH MG: 100 CAPSULE, LIQUID FILLED ORAL at 21:22

## 2018-11-09 RX ADMIN — FERROUS SULFATE TAB 325 MG (65 MG ELEMENTAL FE) SCH MG: 325 (65 FE) TAB at 10:11

## 2018-11-09 RX ADMIN — ACETAMINOPHEN SCH MG: 325 TABLET ORAL at 22:22

## 2018-11-09 RX ADMIN — CEFAZOLIN SODIUM SCH MLS/HR: 1 SOLUTION INTRAVENOUS at 05:16

## 2018-11-09 RX ADMIN — OXYCODONE HYDROCHLORIDE AND ACETAMINOPHEN PRN TAB: 5; 325 TABLET ORAL at 13:51

## 2018-11-09 RX ADMIN — ACETAMINOPHEN SCH MG: 325 TABLET ORAL at 13:51

## 2018-11-09 RX ADMIN — OXYCODONE HYDROCHLORIDE AND ACETAMINOPHEN PRN TAB: 5; 325 TABLET ORAL at 01:53

## 2018-11-09 RX ADMIN — OXYCODONE HYDROCHLORIDE AND ACETAMINOPHEN PRN TAB: 5; 325 TABLET ORAL at 10:11

## 2018-11-09 RX ADMIN — OXYCODONE HYDROCHLORIDE AND ACETAMINOPHEN PRN TAB: 5; 325 TABLET ORAL at 19:40

## 2018-11-09 RX ADMIN — ENOXAPARIN SODIUM SCH MG: 40 INJECTION SUBCUTANEOUS at 10:12

## 2018-11-09 RX ADMIN — DOCUSATE SODIUM SCH MG: 100 CAPSULE, LIQUID FILLED ORAL at 10:11

## 2018-11-09 RX ADMIN — ACETAMINOPHEN SCH MG: 325 TABLET ORAL at 05:20

## 2018-11-09 RX ADMIN — OXYCODONE HYDROCHLORIDE AND ACETAMINOPHEN PRN TAB: 5; 325 TABLET ORAL at 05:51

## 2018-11-09 RX ADMIN — FERROUS SULFATE TAB 325 MG (65 MG ELEMENTAL FE) SCH MG: 325 (65 FE) TAB at 21:22

## 2018-11-09 RX ADMIN — THERA TABS SCH TAB: TAB at 10:11

## 2018-11-09 RX ADMIN — MORPHINE SULFATE PRN MG: 4 INJECTION INTRAVENOUS at 14:35

## 2018-11-09 RX ADMIN — CEFAZOLIN SODIUM SCH MLS/HR: 1 SOLUTION INTRAVENOUS at 13:52

## 2018-11-09 NOTE — PN
Progress Note





- Progress Note


Date of Service: 11/09/18


Note: 


I saw Ramone. Pain worse since surgery but manageable. Has been OOB to chair 

and also for BM. Pain improving. No N/T. Comfortable in bed now. Painless ankle 

and toe ROM and passive stretch. Dressing c/d/i. Palpable DP pulse. SILT foot. 

Brace on.





Continue daily lovenox, SCD on RLE. TTWB/NWB LLE. Knee ROM ok with PT. Pain 

control. Diet as tolerated.





Irving Duran MD

## 2018-11-09 NOTE — PN
Progress Note





- Progress Note


Date of Service: 11/09/18


SOAP: 


Subjective:


[]Patient seen at bedside, ready to work with PT and get OOB.  Having moderate 

knee pain, especially when leg moved.  Denies SOB, CP, dizziness, 








Objective:


[]


 Vital Signs











Temp  98.4 F   11/09/18 07:56


 


Pulse  66   11/09/18 07:56


 


Resp  24   11/09/18 10:11


 


BP  99/62   11/09/18 07:56


 


Pulse Ox  97   11/09/18 07:56








 Intake & Output











 11/08/18 11/09/18 11/09/18





 18:59 06:59 18:59


 


Intake Total 1500 1035 480


 


Output Total 450 1150 550


 


Balance 1050 -115 -70


 


Weight 137 lb  


 


Intake:   


 


  IV Fluids 1500  


 


    LR 1500  


 


  IVPB  55 


 


    ABX - CEFAZOLIN  55 


 


  Oral  980 480


 


Output:   


 


  Urine 400 1150 550


 


  Estimated Blood Loss 50  








 Laboratory Results - last 24 hr











  11/09/18 11/09/18





  06:12 06:12


 


Hgb  7.9 L 


 


Hct  22 L 


 


Plt Count  332 


 


MPV  6.3 L 


 


Sodium   135


 


Potassium   4.1


 


Chloride   103


 


Carbon Dioxide   25


 


Anion Gap   7


 


BUN   14


 


Creatinine   0.67


 


Est GFR ( Amer)   141.9


 


Est GFR (Non-Af Amer)   117.3


 


BUN/Creatinine Ratio   20.9 H


 


Glucose   106 H


 


Calcium   8.1 L








Left LE brace donned, dressings dry and intact


+DF/PF left ankle


sensation and circulation intact distally








Assessment:


[]s/p removal of ex- fix, ORIF tibial plateau fracture POD #1








Plan:


[]PT/OT


  NWB LLE


  Brace for support


   Dressing change in 1-2 days as per Dr. Duran


   Lovenox 40 mg q 24 hrs- DVT prophylaxis


   monitor H&H


   Home when safe with PT, pain managed and VNS set up

## 2018-11-10 LAB
HCT VFR BLD AUTO: 23 % (ref 42–52)
HGB BLD-MCNC: 8.4 G/DL (ref 14–18)
PLATELET # BLD AUTO: 327 10^3/UL (ref 150–450)

## 2018-11-10 RX ADMIN — DOCUSATE SODIUM SCH MG: 100 CAPSULE, LIQUID FILLED ORAL at 22:25

## 2018-11-10 RX ADMIN — FERROUS SULFATE TAB 325 MG (65 MG ELEMENTAL FE) SCH MG: 325 (65 FE) TAB at 22:25

## 2018-11-10 RX ADMIN — ACETAMINOPHEN SCH: 325 TABLET ORAL at 22:25

## 2018-11-10 RX ADMIN — ENOXAPARIN SODIUM SCH MG: 40 INJECTION SUBCUTANEOUS at 08:54

## 2018-11-10 RX ADMIN — OXYCODONE HYDROCHLORIDE AND ACETAMINOPHEN PRN TAB: 5; 325 TABLET ORAL at 14:49

## 2018-11-10 RX ADMIN — THERA TABS SCH TAB: TAB at 08:54

## 2018-11-10 RX ADMIN — OXYCODONE HYDROCHLORIDE AND ACETAMINOPHEN PRN TAB: 5; 325 TABLET ORAL at 02:04

## 2018-11-10 RX ADMIN — FERROUS SULFATE TAB 325 MG (65 MG ELEMENTAL FE) SCH MG: 325 (65 FE) TAB at 08:54

## 2018-11-10 RX ADMIN — OXYCODONE HYDROCHLORIDE AND ACETAMINOPHEN PRN TAB: 5; 325 TABLET ORAL at 23:16

## 2018-11-10 RX ADMIN — MORPHINE SULFATE PRN MG: 4 INJECTION INTRAVENOUS at 12:57

## 2018-11-10 RX ADMIN — ACETAMINOPHEN SCH: 325 TABLET ORAL at 06:06

## 2018-11-10 RX ADMIN — MORPHINE SULFATE PRN MG: 4 INJECTION INTRAVENOUS at 00:10

## 2018-11-10 RX ADMIN — OXYCODONE HYDROCHLORIDE AND ACETAMINOPHEN PRN TAB: 5; 325 TABLET ORAL at 10:23

## 2018-11-10 RX ADMIN — DOCUSATE SODIUM SCH MG: 100 CAPSULE, LIQUID FILLED ORAL at 08:54

## 2018-11-10 RX ADMIN — OXYCODONE HYDROCHLORIDE AND ACETAMINOPHEN PRN TAB: 5; 325 TABLET ORAL at 19:16

## 2018-11-10 RX ADMIN — OXYCODONE HYDROCHLORIDE AND ACETAMINOPHEN PRN TAB: 5; 325 TABLET ORAL at 06:19

## 2018-11-10 RX ADMIN — ACETAMINOPHEN SCH MG: 325 TABLET ORAL at 14:48

## 2018-11-10 NOTE — PN
Progress Note





- Progress Note


Date of Service: 11/10/18


SOAP: 


Subjective:


POD #2 Left tibial plateau ORIF. Doing ok, pain controlled with meds. Denies CP/

SOB, f/c, n/v





Objective:


Vitals: 











Temp Pulse Resp BP Pulse Ox


 


 98.0 F   62   16   109/59   98 


 


 11/10/18 04:23  11/10/18 04:23  11/10/18 08:59  11/10/18 04:23  11/10/18 04:23








Gen: A&Ox3, NAD at rest sitting in bed


LLE: Dressing C/D/I. +f/e at ankle and MTPs, N/V intact





Labs: 


 Laboratory Results - last 24 hr











  11/10/18





  05:12


 


Hgb  8.4 L


 


Hct  23 L


 


Plt Count  327


 


MPV  6.4 L











Assessment:


POD #2 Left tibial plateau ORIF





Plan:


Awaiting home care to be set up


Likely d/c tomorrow or Monday


Cont PT/OT, toe touch WB LLE with walker and brace locked in extension. OK for 

passive ROM at knee


Cont Lovenox for DVT ppx

## 2018-11-11 LAB
HCT VFR BLD AUTO: 24 % (ref 42–52)
HGB BLD-MCNC: 8.8 G/DL (ref 14–18)
PLATELET # BLD AUTO: 370 10^3/UL (ref 150–450)

## 2018-11-11 RX ADMIN — ACETAMINOPHEN SCH: 325 TABLET ORAL at 12:40

## 2018-11-11 RX ADMIN — OXYCODONE HYDROCHLORIDE AND ACETAMINOPHEN PRN TAB: 5; 325 TABLET ORAL at 12:39

## 2018-11-11 RX ADMIN — ACETAMINOPHEN SCH: 325 TABLET ORAL at 05:34

## 2018-11-11 RX ADMIN — THERA TABS SCH TAB: TAB at 08:16

## 2018-11-11 RX ADMIN — OXYCODONE HYDROCHLORIDE AND ACETAMINOPHEN PRN TAB: 5; 325 TABLET ORAL at 16:51

## 2018-11-11 RX ADMIN — FERROUS SULFATE TAB 325 MG (65 MG ELEMENTAL FE) SCH MG: 325 (65 FE) TAB at 08:16

## 2018-11-11 RX ADMIN — FERROUS SULFATE TAB 325 MG (65 MG ELEMENTAL FE) SCH MG: 325 (65 FE) TAB at 21:01

## 2018-11-11 RX ADMIN — ENOXAPARIN SODIUM SCH MG: 40 INJECTION SUBCUTANEOUS at 08:16

## 2018-11-11 RX ADMIN — OXYCODONE HYDROCHLORIDE AND ACETAMINOPHEN PRN TAB: 5; 325 TABLET ORAL at 03:33

## 2018-11-11 RX ADMIN — DOCUSATE SODIUM SCH MG: 100 CAPSULE, LIQUID FILLED ORAL at 21:01

## 2018-11-11 RX ADMIN — OXYCODONE HYDROCHLORIDE AND ACETAMINOPHEN PRN TAB: 5; 325 TABLET ORAL at 21:00

## 2018-11-11 RX ADMIN — ACETAMINOPHEN SCH: 325 TABLET ORAL at 21:27

## 2018-11-11 RX ADMIN — OXYCODONE HYDROCHLORIDE AND ACETAMINOPHEN PRN TAB: 5; 325 TABLET ORAL at 08:17

## 2018-11-11 RX ADMIN — DOCUSATE SODIUM SCH MG: 100 CAPSULE, LIQUID FILLED ORAL at 08:16

## 2018-11-11 NOTE — PN
Progress Note





- Progress Note


Date of Service: 11/11/18


Note: 


I evaluated Ramone this morning.  He reports that the pain is much better.  He 

was able to walk to the nurse's station with the walker, and does ensure me 

that he remained toe-touch weightbearing throughout.  He has not tried any knee 

range of motion yet.  He has been working on ankle and toe pumps.  He reports a 

daily bowel movement.





  On exam, his dressing is clean, dry, and intact.  The brace is well fitting.  

He is flexing and extending his toes and ankle without any pain.  The foot is 

warm and well-perfused with good pulses.  Sensation is intact to light touch.





  His hematocrit is 24.





  Ramone is doing well 3 days after a left bicondylar tibial plateau ORIF.  He 

will remain toe-touch weightbearing in the left lower extremity.  He can work 

on gentle knee range of motion with physical therapy.  Daily Lovenox for DVT 

prophylaxis.  Likely DC home tomorrow.





  Irving Duran MD

## 2018-11-12 VITALS — SYSTOLIC BLOOD PRESSURE: 111 MMHG | DIASTOLIC BLOOD PRESSURE: 71 MMHG

## 2018-11-12 LAB
HCT VFR BLD AUTO: 25 % (ref 42–52)
HGB BLD-MCNC: 8.7 G/DL (ref 14–18)
PLATELET # BLD AUTO: 376 10^3/UL (ref 150–450)

## 2018-11-12 RX ADMIN — DOCUSATE SODIUM SCH MG: 100 CAPSULE, LIQUID FILLED ORAL at 07:54

## 2018-11-12 RX ADMIN — OXYCODONE HYDROCHLORIDE AND ACETAMINOPHEN PRN TAB: 5; 325 TABLET ORAL at 09:09

## 2018-11-12 RX ADMIN — OXYCODONE HYDROCHLORIDE AND ACETAMINOPHEN PRN TAB: 5; 325 TABLET ORAL at 04:58

## 2018-11-12 RX ADMIN — FERROUS SULFATE TAB 325 MG (65 MG ELEMENTAL FE) SCH MG: 325 (65 FE) TAB at 07:54

## 2018-11-12 RX ADMIN — ACETAMINOPHEN SCH: 325 TABLET ORAL at 05:00

## 2018-11-12 RX ADMIN — ENOXAPARIN SODIUM SCH MG: 40 INJECTION SUBCUTANEOUS at 07:54

## 2018-11-12 RX ADMIN — THERA TABS SCH TAB: TAB at 07:54

## 2018-11-12 RX ADMIN — OXYCODONE HYDROCHLORIDE AND ACETAMINOPHEN PRN TAB: 5; 325 TABLET ORAL at 01:01

## 2018-11-12 NOTE — PN
Progress Note





- Progress Note


Date of Service: 11/12/18


SOAP: 


Subjective:


POD #4 left tibial plateau ORIF. Doing well, pain improving. Denies CP/SOB, f/c

, n/v





Objective:


Vitals: 











Temp Pulse Resp BP Pulse Ox


 


 98.1 F   55   18   111/71   97 


 


 11/12/18 07:55  11/12/18 07:55  11/12/18 09:09  11/12/18 07:55  11/12/18 07:57








Gen: A&Ox3, NAD at rest sitting in bed


LLE: Incisions C/D/I. Mild edema through lower leg, no erythema. +f/e at ankle 

and MTPs. N/V intact, calf soft, NT





Labs: 


 Laboratory Results - last 24 hr











  11/12/18





  04:56


 


Hgb  8.7 L


 


Hct  25 L


 


Plt Count  376


 


MPV  6.3 L











Assessment:


POD #4 left tibial plateau ORIF





Plan:


D/C home today


Homecare to see pt Wed with home PT services


Toe touch WB LLE with brace locked in extension, ok to unlock for ROM only


Cont Lovenox for DVT ppx


F/u with Dr. Duran 10-14 days post op